# Patient Record
Sex: FEMALE | Race: BLACK OR AFRICAN AMERICAN | Employment: FULL TIME | URBAN - METROPOLITAN AREA
[De-identification: names, ages, dates, MRNs, and addresses within clinical notes are randomized per-mention and may not be internally consistent; named-entity substitution may affect disease eponyms.]

---

## 2017-02-18 ENCOUNTER — HOSPITAL ENCOUNTER (EMERGENCY)
Age: 32
Discharge: HOME | End: 2017-02-18
Attending: FAMILY MEDICINE

## 2017-02-18 VITALS
TEMPERATURE: 97.2 F | HEIGHT: 67 IN | HEART RATE: 60 BPM | OXYGEN SATURATION: 98 % | BODY MASS INDEX: 31.86 KG/M2 | SYSTOLIC BLOOD PRESSURE: 180 MMHG | DIASTOLIC BLOOD PRESSURE: 116 MMHG | RESPIRATION RATE: 16 BRPM | WEIGHT: 203 LBS

## 2017-02-18 DIAGNOSIS — K08.409: Primary | ICD-10-CM

## 2017-02-18 DIAGNOSIS — K08.89 PAIN OF TOOTH SOCKET: ICD-10-CM

## 2017-02-18 RX ORDER — IBUPROFEN 800 MG/1
800 TABLET ORAL
Qty: 20 TAB | Refills: 0 | Status: SHIPPED | OUTPATIENT
Start: 2017-02-18 | End: 2017-02-25

## 2017-02-18 RX ORDER — AMOXICILLIN 500 MG/1
1000 CAPSULE ORAL 2 TIMES DAILY
Qty: 40 CAP | Refills: 0 | Status: SHIPPED | OUTPATIENT
Start: 2017-02-18 | End: 2017-06-21

## 2017-02-18 NOTE — DISCHARGE INSTRUCTIONS
Dental Pain: After Your Visit  Your Care Instructions  The most common cause of dental pain is tooth decay. It can also be caused by an infection of the tooth (abscess) or gum, a tooth that has not broken all the way through the gum (impacted tooth), or a problem with the nerve-filled center of the tooth. Follow-up care is a key part of your treatment and safety. Be sure to make and go to all appointments, and call your doctor if you are having problems. Its also a good idea to know your test results and keep a list of the medicines you take. How can you care for yourself at home? · Contact a dentist for follow-up care. · Put ice or a cold pack on the outside of your mouth for 10 to 20 minutes at a time to reduce pain and swelling. Put a thin cloth between the ice and your skin. · Take an over-the-counter pain medicine, such as acetaminophen (Tylenol), ibuprofen (Advil, Motrin), or naproxen (Aleve). Read and follow all instructions on the label. · Do not take two or more pain medicines at the same time unless the doctor told you to. Many pain medicines have acetaminophen, which is Tylenol. Too much acetaminophen (Tylenol) can be harmful. · Rinse your mouth with warm salt water every 2 hours to help relieve pain and swelling from an infected tooth. Mix 1 teaspoon of salt in 8 ounces of water. · If your doctor prescribed antibiotics, take them as directed. Do not stop taking them just because you feel better. You need to take the full course of antibiotics. When should you call for help? Call your doctor now or seek immediate medical care if:  · You have signs of infection, such as:  ¨ Increased pain, swelling, warmth, or redness. ¨ Pus draining from the gum, tooth, or face. ¨ A fever. Watch closely for changes in your health, and be sure to contact your doctor if:  · You do not get better as expected. Where can you learn more?    Go to [email or web address].be  Enter P464 in the search box to learn more about \"Dental Pain: After Your Visit. \"   © 3125-9785 [employer]. Care instructions adapted under license by [employer] (which disclaims liability or warranty for this information). This care instruction is for use with your licensed healthcare professional. If you have questions about a medical condition or this instruction, always ask your healthcare professional. [address] any warranty or liability for your use of this information. Content Version: 99.6.533064;  Last Revised: May 17, 2013

## 2017-02-18 NOTE — UC PROVIDER NOTE
Patient is a 32 y.o. female presenting with dental problem. The history is provided by the patient. Dental Pain    This is a new problem. The current episode started more than 1 week ago. Episode frequency: intermittent  The pain is located in the left upper mouth. The pain is at a severity of 3/10. There was no swelling and no headaches. The patient has no cardiac history. Past Medical History   Diagnosis Date    Anemia NEC      with pregnancy. Taking IRON    Essential hypertension     Hypertension     Infertility      cerclage, prev sab    Ovarian cyst         Past Surgical History   Procedure Laterality Date    Hx lap cholecystectomy  2008    Hx gyn       Circlage         Family History   Problem Relation Age of Onset    Stroke Mother     Diabetes Father     Hypertension Father     Diabetes Sister         Social History     Social History    Marital status: SINGLE     Spouse name: N/A    Number of children: N/A    Years of education: N/A     Occupational History    Not on file. Social History Main Topics    Smoking status: Former Smoker    Smokeless tobacco: Never Used      Comment: one or two cigarettes a month    Alcohol use Yes      Comment: occasional    Drug use: No      Comment: daily    Sexual activity: Yes     Partners: Male     Birth control/ protection: None     Other Topics Concern    Not on file     Social History Narrative                ALLERGIES: Review of patient's allergies indicates no known allergies. Review of Systems   HENT: Positive for dental problem. Vitals:    02/18/17 1506 02/18/17 1510   BP:  (!) 180/116   Pulse:  60   Resp:  16   Temp:  97.2 °F (36.2 °C)   SpO2:  98%   Weight: 92.1 kg (203 lb)    Height: 5' 7\" (1.702 m)        Physical Exam   Constitutional: No distress. HENT:   Mouth/Throat: Oropharynx is clear and moist and mucous membranes are normal. No oral lesions. Abnormal dentition (absent tooth as shown ).  No dental abscesses or dental caries. Nursing note and vitals reviewed.       MDM     Differential Diagnosis; Clinical Impression; Plan:     CLINICAL IMPRESSION:  Acquired absence of single tooth  (primary encounter diagnosis)  Pain of tooth socket      DDX    Plan:    Follow [facility] dentist  Amoxicillin and motrin with tylenol   Amount and/or Complexity of Data Reviewed:    Review and summarize past medical records:  Yes  Risk of Significant Complications, Morbidity, and/or Mortality:   Presenting problems:  Low  Management options:  Low  Progress:   Patient progress:  Stable      Procedures

## 2017-06-21 ENCOUNTER — HOSPITAL ENCOUNTER (EMERGENCY)
Age: 32
Discharge: HOME | End: 2017-06-21
Attending: EMERGENCY MEDICINE
Payer: SELF-PAY

## 2017-06-21 ENCOUNTER — APPOINTMENT (OUTPATIENT)
Dept: ULTRASOUND IMAGING | Age: 32
End: 2017-06-21
Attending: EMERGENCY MEDICINE
Payer: SELF-PAY

## 2017-06-21 VITALS
RESPIRATION RATE: 18 BRPM | HEIGHT: 67 IN | SYSTOLIC BLOOD PRESSURE: 163 MMHG | DIASTOLIC BLOOD PRESSURE: 97 MMHG | WEIGHT: 188.71 LBS | TEMPERATURE: 98.3 F | BODY MASS INDEX: 29.62 KG/M2 | OXYGEN SATURATION: 100 % | HEART RATE: 63 BPM

## 2017-06-21 DIAGNOSIS — O20.9 VAGINAL BLEEDING IN PREGNANCY, FIRST TRIMESTER: Primary | ICD-10-CM

## 2017-06-21 LAB
ANION GAP BLD CALC-SCNC: 8 MMOL/L (ref 5–15)
BASOPHILS # BLD AUTO: 0 K/UL (ref 0–0.1)
BASOPHILS # BLD: 0 % (ref 0–1)
BUN SERPL-MCNC: 12 MG/DL (ref 6–20)
BUN/CREAT SERPL: 13 (ref 12–20)
CALCIUM SERPL-MCNC: 8.9 MG/DL (ref 8.5–10.1)
CHLORIDE SERPL-SCNC: 101 MMOL/L (ref 97–108)
CO2 SERPL-SCNC: 27 MMOL/L (ref 21–32)
CREAT SERPL-MCNC: 0.92 MG/DL (ref 0.55–1.02)
EOSINOPHIL # BLD: 0.1 K/UL (ref 0–0.4)
EOSINOPHIL NFR BLD: 1 % (ref 0–7)
ERYTHROCYTE [DISTWIDTH] IN BLOOD BY AUTOMATED COUNT: 17 % (ref 11.5–14.5)
GLUCOSE SERPL-MCNC: 80 MG/DL (ref 65–100)
HCG SERPL-ACNC: 5217 MIU/ML (ref 0–6)
HCT VFR BLD AUTO: 38.6 % (ref 35–47)
HGB BLD-MCNC: 12.7 G/DL (ref 11.5–16)
LYMPHOCYTES # BLD AUTO: 28 % (ref 12–49)
LYMPHOCYTES # BLD: 3 K/UL (ref 0.8–3.5)
MCH RBC QN AUTO: 26.3 PG (ref 26–34)
MCHC RBC AUTO-ENTMCNC: 32.9 G/DL (ref 30–36.5)
MCV RBC AUTO: 79.9 FL (ref 80–99)
MONOCYTES # BLD: 0.6 K/UL (ref 0–1)
MONOCYTES NFR BLD AUTO: 6 % (ref 5–13)
NEUTS SEG # BLD: 6.9 K/UL (ref 1.8–8)
NEUTS SEG NFR BLD AUTO: 65 % (ref 32–75)
PLATELET # BLD AUTO: 270 K/UL (ref 150–400)
POTASSIUM SERPL-SCNC: 3.2 MMOL/L (ref 3.5–5.1)
RBC # BLD AUTO: 4.83 M/UL (ref 3.8–5.2)
SODIUM SERPL-SCNC: 136 MMOL/L (ref 136–145)
WBC # BLD AUTO: 10.7 K/UL (ref 3.6–11)

## 2017-06-21 PROCEDURE — 99282 EMERGENCY DEPT VISIT SF MDM: CPT

## 2017-06-21 PROCEDURE — 80048 BASIC METABOLIC PNL TOTAL CA: CPT | Performed by: EMERGENCY MEDICINE

## 2017-06-21 PROCEDURE — 84702 CHORIONIC GONADOTROPIN TEST: CPT | Performed by: EMERGENCY MEDICINE

## 2017-06-21 PROCEDURE — 76817 TRANSVAGINAL US OBSTETRIC: CPT

## 2017-06-21 PROCEDURE — 36415 COLL VENOUS BLD VENIPUNCTURE: CPT | Performed by: EMERGENCY MEDICINE

## 2017-06-21 PROCEDURE — 85025 COMPLETE CBC W/AUTO DIFF WBC: CPT | Performed by: EMERGENCY MEDICINE

## 2017-06-21 NOTE — ED NOTES
Bedside shift change report given to Natasha Chance (oncoming nurse) by Christina Willams (offgoing nurse). Report included the following information SBAR and ED Summary.

## 2017-06-21 NOTE — DISCHARGE INSTRUCTIONS
Call your OB and have your hormone levels in 48-72 hours. Vaginal Bleeding During Pregnancy: Care Instructions  Your Care Instructions    Many women have some vaginal bleeding when they are pregnant. In some cases, the bleeding is not serious. And there aren't any more problems with the pregnancy. But sometimes bleeding is a sign of a more serious problem. This is more common if the bleeding is heavy or painful. Examples of more serious problems include miscarriage, an ectopic pregnancy, or a problem with the placenta. You may have to see your doctor again to be sure everything is okay. You may also need more tests to find the cause of the bleeding. For some women, home treatment is all they need. But it depends on what is causing the bleeding. Be sure to tell your doctor if you have any new symptoms or if your symptoms get worse. The doctor has checked you carefully, but problems can develop later. If you notice any problems or new symptoms, get medical treatment right away. Follow-up care is a key part of your treatment and safety. Be sure to make and go to all appointments, and call your doctor if you are having problems. It's also a good idea to know your test results and keep a list of the medicines you take. How can you care for yourself at home? · If your doctor prescribed medicines, take them exactly as directed. Call your doctor if you think you are having a problem with your medicine. · Do not have sex until the bleeding stops and your doctor says it's okay. · Ask your doctor about other activities you can or can't do. · Get a lot of rest. Being pregnant can make you tired. · Eat a healthy diet. Include a lot of peas, beans, and leafy green vegetables. Talk to a dietitian if you need help planning your diet. · Do not use nonsteroidal anti-inflammatory drugs (NSAIDs), such as ibuprofen (Advil, Motrin), naproxen (Aleve), or aspirin, unless your doctor says it is okay.   When should you call for help? Call 911 anytime you think you may need emergency care. For example, call if:  · You have sudden, severe pain in your belly. · You passed out (lost consciousness). · You have severe vaginal bleeding. Call your doctor now or seek immediate medical care if:  · You are dizzy or lightheaded or you feel like you may faint. · You have new or worse pain in your belly or pelvis. · Your vaginal bleeding is getting worse. · You have increased pain in the vaginal area. · You have a fever. Watch closely for changes in your health, and be sure to contact your doctor if:  · You have new or worse vaginal discharge. · You do not get better as expected. Where can you learn more? Go to http://molly-howie.info/. Enter E678 in the search box to learn more about \"Vaginal Bleeding During Pregnancy: Care Instructions. \"  Current as of: March 16, 2017  Content Version: 11.3  © 5031-5670 [employer]. Care instructions adapted under license by [employer] (which disclaims liability or warranty for this information). If you have questions about a medical condition or this instruction, always ask your healthcare professional. [address] any warranty or liability for your use of this information.

## 2017-06-21 NOTE — ED PROVIDER NOTES
HPI Comments: Tristan Cline is a 32 y.o.  female who presents ambulatory to ED [facility] ED after having spotting x ~3 days. Pt reports she had positive pregnancy test 5 days ago, and reports LMP (2017). She states she also had mild spotting in (2017), but states she could be as much as 9.5 weeks pregnant. Pt reports hx of cerclage at ~15 weeks, and states she was referred to ED by her OB/Gyn today to have procedure again. Pt denies allergies to any medications. She specifically denies ABD pain, dysuria, and back pain. PCP: None  OB/Gyn: Glenna Cr MD    There are no other complaints, changes, or physical findings at this time. The history is provided by the patient. Past Medical History:   Diagnosis Date    Anemia NEC     with pregnancy. Taking IRON    Essential hypertension     Hypertension     Infertility     cerclage, prev sab    Ovarian cyst        Past Surgical History:   Procedure Laterality Date    HX GYN      Circlage    HX LAP CHOLECYSTECTOMY           Family History:   Problem Relation Age of Onset    Stroke Mother     Diabetes Father     Hypertension Father     Diabetes Sister        Social History     Social History    Marital status: SINGLE     Spouse name: N/A    Number of children: N/A    Years of education: N/A     Occupational History    Not on file. Social History Main Topics    Smoking status: Former Smoker    Smokeless tobacco: Never Used      Comment: one or two cigarettes a month    Alcohol use Yes      Comment: occasional    Drug use: No      Comment: daily    Sexual activity: Yes     Partners: Male     Birth control/ protection: None     Other Topics Concern    Not on file     Social History Narrative         ALLERGIES: Review of patient's allergies indicates no known allergies. Review of Systems   Constitutional: Negative for activity change, appetite change, chills, fever and unexpected weight change.    HENT: Negative for congestion. Eyes: Negative for pain and visual disturbance. Respiratory: Negative for cough and shortness of breath. Cardiovascular: Negative for chest pain. Gastrointestinal: Negative for abdominal pain, diarrhea, nausea and vomiting. Genitourinary: Positive for vaginal bleeding (spotting). Negative for dysuria. Musculoskeletal: Negative for back pain. Skin: Negative for rash. Neurological: Negative for headaches. Patient Vitals for the past 12 hrs:   Temp Pulse Resp BP SpO2   06/21/17 1515 - - - (!) 163/97 100 %   06/21/17 1356 - - - - 100 %   06/21/17 1323 98.3 °F (36.8 °C) 63 18 (!) 175/114 100 %            Physical Exam   Constitutional: She is oriented to person, place, and time. She appears well-developed and well-nourished. Overweight female   HENT:   Head: Normocephalic and atraumatic. Mouth/Throat: Oropharynx is clear and moist.   Eyes: Conjunctivae and EOM are normal. Pupils are equal, round, and reactive to light. Right eye exhibits no discharge. Left eye exhibits no discharge. Neck: Normal range of motion. Neck supple. Cardiovascular: Normal rate and normal heart sounds. No murmur heard. Pulmonary/Chest: Effort normal and breath sounds normal. No respiratory distress. She has no wheezes. She has no rales. Abdominal: Soft. Bowel sounds are normal. She exhibits no distension. There is no tenderness. Musculoskeletal: Normal range of motion. Neurological: She is alert and oriented to person, place, and time. No cranial nerve deficit. She exhibits normal muscle tone. Skin: Skin is warm and dry. No rash noted. She is not diaphoretic. Nursing note and vitals reviewed. MDM  Number of Diagnoses or Management Options  Diagnosis management comments: Well appearing, in no acute distress, with hemodynamic stability. No evidence of ectopic on exam. Will check US for dates and viability for pregnancy.        Amount and/or Complexity of Data Reviewed  Clinical lab tests: ordered and reviewed  Tests in the radiology section of CPT®: ordered and reviewed  Review and summarize past medical records: yes  Independent visualization of images, tracings, or specimens: yes      ED Course       Procedures  16:00 Discussed results and need for 48-72 hour recheck. No further bleeding. LABORATORY TESTS:  Recent Results (from the past 12 hour(s))   CBC WITH AUTOMATED DIFF    Collection Time: 06/21/17  2:20 PM   Result Value Ref Range    WBC 10.7 3.6 - 11.0 K/uL    RBC 4.83 3.80 - 5.20 M/uL    HGB 12.7 11.5 - 16.0 g/dL    HCT 38.6 35.0 - 47.0 %    MCV 79.9 (L) 80.0 - 99.0 FL    MCH 26.3 26.0 - 34.0 PG    MCHC 32.9 30.0 - 36.5 g/dL    RDW 17.0 (H) 11.5 - 14.5 %    PLATELET 779 270 - 369 K/uL    NEUTROPHILS 65 32 - 75 %    LYMPHOCYTES 28 12 - 49 %    MONOCYTES 6 5 - 13 %    EOSINOPHILS 1 0 - 7 %    BASOPHILS 0 0 - 1 %    ABS. NEUTROPHILS 6.9 1.8 - 8.0 K/UL    ABS. LYMPHOCYTES 3.0 0.8 - 3.5 K/UL    ABS. MONOCYTES 0.6 0.0 - 1.0 K/UL    ABS. EOSINOPHILS 0.1 0.0 - 0.4 K/UL    ABS. BASOPHILS 0.0 0.0 - 0.1 K/UL   METABOLIC PANEL, BASIC    Collection Time: 06/21/17  2:20 PM   Result Value Ref Range    Sodium 136 136 - 145 mmol/L    Potassium 3.2 (L) 3.5 - 5.1 mmol/L    Chloride 101 97 - 108 mmol/L    CO2 27 21 - 32 mmol/L    Anion gap 8 5 - 15 mmol/L    Glucose 80 65 - 100 mg/dL    BUN 12 6 - 20 MG/DL    Creatinine 0.92 0.55 - 1.02 MG/DL    BUN/Creatinine ratio 13 12 - 20      GFR est AA >60 >60 ml/min/1.73m2    GFR est non-AA >60 >60 ml/min/1.73m2    Calcium 8.9 8.5 - 10.1 MG/DL   TOTAL HCG, QT. Collection Time: 06/21/17  2:20 PM   Result Value Ref Range    Beta HCG, QT 5217 (H) 0 - 6 MIU/ML       IMAGING RESULTS:  US UTS TRANSVAGINAL OB   Final Result   EXAM:  US UTS TRANSVAGINAL OB     INDICATION:  First trimester pregnancy with positive urine pregnancy test 5 days  ago. Vaginal bleeding started 3 days ago. Previous spontaneous abortions.  LMP on  4/15/2017.     COMPARISON: None.     TECHNIQUE: Endovaginal ultrasound of the female pelvis, limited first trimester  obstetric ultrasound.     FINDINGS: Transabdominal ultrasound:     Not performed.     Endovaginal ultrasound:      Urinary bladder: Nondistended.     Uterus: measures 7.9 x 6.9 x 5.4 cm, which is within normal limits. There is no  sonographic myometrial abnormality. Cervix contains nabothian cysts. Cervix is  closed.     Endometrium: Within the fundal portion of the endometrium, a gestational sac has  a mean sac diameter of 0.88 cm. Yolk sac is present. Fetal pole is not present. No fetal cardiac activity. Amniotic fluid volume is within normal limits. Placenta is not yet developed. Small heterogeneous hypoechoic subchorionic  hemorrhage.     Right ovary: 4.9 x 2.9 x 2.1 cm. Blood flow is present in the right ovary. No  right adnexal mass or cyst.     Left ovary: 3.6 x 2.2 x 2.2 cm. Blood flow is present in the left ovary. No  adnexal cyst or mass.     Free fluid: Physiologic.     IMPRESSION  IMPRESSION:   Small gestational sac contains a yolk sac only. No fetal pole. Too small to  date. Small subchorionic hemorrhage. Follow-up serial beta hCG levels. Repeat  ultrasound if clinically indicated. IMPRESSION:  1. Vaginal bleeding in pregnancy, first trimester        PLAN:  1. Follow-up Information     Follow up With Details Comments Contact Info    \[facility]\"" EMERGENCY DEPT  If symptoms worsen [address] [place] [address]  [phone]        Return to ED if worse     DISCHARGE NOTE:  4:05 PM  The patient is ready for discharge. The patients signs, symptoms, diagnosis, and instructions for discharge have been discussed and the pt has conveyed their understanding. The patient is to follow up as recommended or return to the ER should their symptoms worsen. Plan has been discussed and patient has conveyed their agreement.         This note is prepared by Kyle Sanchez, acting as Scribe for [email or web address], MD.    Marisela Burkitt, MD: The scribe's documentation has been prepared under my direction and personally reviewed by me in its entirety. I confirm that the note above accurately reflects all work, treatment, procedures, and medical decision making performed by me.

## 2017-06-21 NOTE — ED NOTES
Patient states that she took a pregnancy test 5 days ago and that she started \"spotting\" about 3 days ago. Patient states that she had to have a cerclage with her last pregnancy and was previously told that she needed to be seen as soon as possible with any future pregnancy. Denies any abdominal pain at this time.

## 2017-07-11 LAB
HBSAG, EXTERNAL: NEGATIVE
HIV, EXTERNAL: NON REACTIVE
RPR, EXTERNAL: NON REACTIVE
RUBELLA, EXTERNAL: NORMAL
TYPE, ABO & RH, EXTERNAL: NORMAL

## 2017-07-24 LAB
CHLAMYDIA, EXTERNAL: NEGATIVE
N. GONORRHEA, EXTERNAL: NEGATIVE

## 2017-08-25 ENCOUNTER — HOSPITAL ENCOUNTER (EMERGENCY)
Age: 32
Discharge: HOME | End: 2017-08-25
Attending: EMERGENCY MEDICINE
Payer: MEDICAID

## 2017-08-25 VITALS
RESPIRATION RATE: 12 BRPM | OXYGEN SATURATION: 100 % | HEART RATE: 95 BPM | SYSTOLIC BLOOD PRESSURE: 154 MMHG | TEMPERATURE: 98.3 F | DIASTOLIC BLOOD PRESSURE: 87 MMHG

## 2017-08-25 DIAGNOSIS — Z98.890 H/O CERVICAL CERCLAGE, CURRENTLY PREGNANT, SECOND TRIMESTER: ICD-10-CM

## 2017-08-25 DIAGNOSIS — O46.92 VAGINAL BLEEDING IN PREGNANCY, SECOND TRIMESTER: Primary | ICD-10-CM

## 2017-08-25 DIAGNOSIS — N30.00 ACUTE CYSTITIS WITHOUT HEMATURIA: ICD-10-CM

## 2017-08-25 DIAGNOSIS — O09.292 H/O CERVICAL CERCLAGE, CURRENTLY PREGNANT, SECOND TRIMESTER: ICD-10-CM

## 2017-08-25 LAB
ALBUMIN SERPL-MCNC: 2.9 G/DL (ref 3.5–5)
ALBUMIN/GLOB SERPL: 0.7 {RATIO} (ref 1.1–2.2)
ALP SERPL-CCNC: 64 U/L (ref 45–117)
ALT SERPL-CCNC: 12 U/L (ref 12–78)
ANION GAP SERPL CALC-SCNC: 10 MMOL/L (ref 5–15)
APPEARANCE UR: ABNORMAL
AST SERPL-CCNC: 9 U/L (ref 15–37)
BACTERIA URNS QL MICRO: ABNORMAL /HPF
BASOPHILS # BLD: 0 K/UL (ref 0–0.1)
BASOPHILS NFR BLD: 0 % (ref 0–1)
BILIRUB SERPL-MCNC: 0.2 MG/DL (ref 0.2–1)
BILIRUB UR QL CFM: NEGATIVE
BUN SERPL-MCNC: 8 MG/DL (ref 6–20)
BUN/CREAT SERPL: 10 (ref 12–20)
CALCIUM SERPL-MCNC: 8.5 MG/DL (ref 8.5–10.1)
CHLORIDE SERPL-SCNC: 103 MMOL/L (ref 97–108)
CO2 SERPL-SCNC: 23 MMOL/L (ref 21–32)
COLOR UR: ABNORMAL
CREAT SERPL-MCNC: 0.78 MG/DL (ref 0.55–1.02)
EOSINOPHIL # BLD: 0.1 K/UL (ref 0–0.4)
EOSINOPHIL NFR BLD: 1 % (ref 0–7)
EPITH CASTS URNS QL MICRO: ABNORMAL /LPF
ERYTHROCYTE [DISTWIDTH] IN BLOOD BY AUTOMATED COUNT: 14.7 % (ref 11.5–14.5)
GLOBULIN SER CALC-MCNC: 4 G/DL (ref 2–4)
GLUCOSE SERPL-MCNC: 125 MG/DL (ref 65–100)
GLUCOSE UR STRIP.AUTO-MCNC: NEGATIVE MG/DL
HCT VFR BLD AUTO: 34.7 % (ref 35–47)
HGB BLD-MCNC: 11.5 G/DL (ref 11.5–16)
HGB UR QL STRIP: ABNORMAL
KETONES UR QL STRIP.AUTO: ABNORMAL MG/DL
LEUKOCYTE ESTERASE UR QL STRIP.AUTO: ABNORMAL
LYMPHOCYTES # BLD: 1.9 K/UL (ref 0.8–3.5)
LYMPHOCYTES NFR BLD: 21 % (ref 12–49)
MCH RBC QN AUTO: 26.4 PG (ref 26–34)
MCHC RBC AUTO-ENTMCNC: 33.1 G/DL (ref 30–36.5)
MCV RBC AUTO: 79.6 FL (ref 80–99)
MONOCYTES # BLD: 0.4 K/UL (ref 0–1)
MONOCYTES NFR BLD: 5 % (ref 5–13)
NEUTS SEG # BLD: 6.5 K/UL (ref 1.8–8)
NEUTS SEG NFR BLD: 73 % (ref 32–75)
NITRITE UR QL STRIP.AUTO: NEGATIVE
PH UR STRIP: 6.5 [PH] (ref 5–8)
PLATELET # BLD AUTO: 249 K/UL (ref 150–400)
POTASSIUM SERPL-SCNC: 2.9 MMOL/L (ref 3.5–5.1)
PROT SERPL-MCNC: 6.9 G/DL (ref 6.4–8.2)
PROT UR STRIP-MCNC: 30 MG/DL
RBC # BLD AUTO: 4.36 M/UL (ref 3.8–5.2)
RBC #/AREA URNS HPF: ABNORMAL /HPF (ref 0–5)
SODIUM SERPL-SCNC: 136 MMOL/L (ref 136–145)
SP GR UR REFRACTOMETRY: 1.02 (ref 1–1.03)
UA: UC IF INDICATED,UAUC: ABNORMAL
UROBILINOGEN UR QL STRIP.AUTO: 1 EU/DL (ref 0.2–1)
WBC # BLD AUTO: 9 K/UL (ref 3.6–11)
WBC URNS QL MICRO: ABNORMAL /HPF (ref 0–4)

## 2017-08-25 PROCEDURE — 85025 COMPLETE CBC W/AUTO DIFF WBC: CPT | Performed by: EMERGENCY MEDICINE

## 2017-08-25 PROCEDURE — 74011250637 HC RX REV CODE- 250/637: Performed by: EMERGENCY MEDICINE

## 2017-08-25 PROCEDURE — 99284 EMERGENCY DEPT VISIT MOD MDM: CPT

## 2017-08-25 PROCEDURE — 80053 COMPREHEN METABOLIC PANEL: CPT | Performed by: EMERGENCY MEDICINE

## 2017-08-25 PROCEDURE — 87086 URINE CULTURE/COLONY COUNT: CPT | Performed by: EMERGENCY MEDICINE

## 2017-08-25 PROCEDURE — 81001 URINALYSIS AUTO W/SCOPE: CPT | Performed by: EMERGENCY MEDICINE

## 2017-08-25 PROCEDURE — 36415 COLL VENOUS BLD VENIPUNCTURE: CPT | Performed by: EMERGENCY MEDICINE

## 2017-08-25 PROCEDURE — 87491 CHLMYD TRACH DNA AMP PROBE: CPT | Performed by: EMERGENCY MEDICINE

## 2017-08-25 RX ORDER — CEPHALEXIN 500 MG/1
500 CAPSULE ORAL 3 TIMES DAILY
Qty: 15 CAP | Refills: 0 | Status: SHIPPED | OUTPATIENT
Start: 2017-08-25 | End: 2017-08-30

## 2017-08-25 RX ORDER — POTASSIUM CHLORIDE 20 MEQ/1
60 TABLET, EXTENDED RELEASE ORAL
Status: COMPLETED | OUTPATIENT
Start: 2017-08-25 | End: 2017-08-25

## 2017-08-25 RX ADMIN — POTASSIUM CHLORIDE 60 MEQ: 1500 TABLET, EXTENDED RELEASE ORAL at 10:44

## 2017-08-25 NOTE — ED NOTES
Pt arrives to ED via triage c/o vaginal bleeding (pink in color) which began this morning-- no clots or abnormal cramping per pt. Pt had a cerclage last week. This is the pts 5th pregnancy with 1 living child, 3 miscarriages. Monitor x 2. Pt placed in bed in position of comfort with side rails up x 2, call bell within reach. Pt instructed to call for nursing assistance with any ambulation or other needs.

## 2017-08-25 NOTE — DISCHARGE INSTRUCTIONS
Urinary Tract Infection in Pregnancy: Care Instructions  Your Care Instructions    A urinary tract infection, or UTI, is an infection of the bladder and other urinary structures. Most UTIs occur in the bladder. They often cause pain or burning when you urinate. UTI is the most common bacterial infection in pregnancy. If untreated, a UTI could lead to problems such as a kidney infection or  labor. Most UTIs can be cured with antibiotics. Your doctor will prescribe an antibiotic that is safe during pregnancy. Be sure to finish your medicine so that the infection does not spread to your kidneys. Follow-up care is a key part of your treatment and safety. Be sure to make and go to all appointments, and call your doctor if you are having problems. It's also a good idea to know your test results and keep a list of the medicines you take. How can you care for yourself at home? · Take your antibiotics as directed. Do not stop taking them just because you feel better. You need to take the full course of antibiotics. · Drink extra water and other fluids for the next day or two. This will help wash out the bacteria causing the infection. If you have kidney, heart, or liver disease and have to limit fluids, talk with your doctor before you increase the amount of fluids you drink. · Do not drink alcohol. · Urinate often. Try to empty your bladder each time. Preventing UTIs  · Drink plenty of fluids, enough so that your urine is light yellow or clear like water. This helps you urinate often, which clears bacteria from your system. If you have kidney, heart, or liver disease and have to limit fluids, talk with your doctor before you increase the amount of fluids you drink. · Urinate when you first have the urge. · Urinate right after you have sex. This is the best way for women to avoid UTIs. · When going to the bathroom, wipe from front to back to keep bacteria from entering the vagina or urethra.   When should you call for help? Call your doctor now or seek immediate medical care if:  · Symptoms such as fever, chills, nausea, or vomiting get worse or appear for the first time. · You have new pain in your back just below your rib cage. This is called flank pain. · There is new blood or pus in your urine. · You have any problems with your antibiotic medicine. Watch closely for changes in your health, and be sure to contact your doctor if:  · You are not getting better after 1 day (24 hours). · You have new symptoms, such as blood in your urine. Where can you learn more? Go to http://molly-howie.info/. Enter M982 in the search box to learn more about \"Urinary Tract Infection in Pregnancy: Care Instructions. \"  Current as of: March 16, 2017  Content Version: 11.3  © 4529-9651 [employer]. Care instructions adapted under license by [employer] (which disclaims liability or warranty for this information). If you have questions about a medical condition or this instruction, always ask your healthcare professional. [address] any warranty or liability for your use of this information.

## 2017-08-25 NOTE — ED PROVIDER NOTES
HPI Comments: Olinda Chandra is a 32 y.o. Female Mike Neves currently approximately 13 wga with PMhx significant for anemia and HTN who presents ambulatory to the ED for further evaluation of vaginal bleeding x this morning. The pt is 8 days s/p cerclage by Dr. Dahiana Jimenez. She reports associated sx of a tan colored vaginal discharge x1 week as well. She expresses that she called her OB-GYN yesterday to make an appointment for the vaginal discharge but upon waking up this morning she noticed some vaginal bleeding leading her to the ED. She also notes that she has been having heavy vaginal discharge after using the restroom x1 week. The pt ensures that she has not visualized any clots or gushes of blood only spotting. She denies any fevers, chills, chest pain, SOB, cough, abdominal pain, nausea, vomiting, diarrhea, dysuria, hematuria or pelvic pain. PCP: None  OB-GYN: Nadia Medina MD      There are no other complaints, changes or physical findings at this time. Written by Theresa Gallegos ED Scribe, as dictated by Kulwinder Dejesus MD     The history is provided by the patient. No  was used. Past Medical History:   Diagnosis Date    Anemia NEC     with pregnancy. Taking IRON    Essential hypertension     Hypertension     Infertility     cerclage, prev sab    Ovarian cyst        Past Surgical History:   Procedure Laterality Date    HX GYN      Circlage    HX LAP CHOLECYSTECTOMY  2008         Family History:   Problem Relation Age of Onset    Stroke Mother     Diabetes Father     Hypertension Father     Diabetes Sister        Social History     Social History    Marital status: SINGLE     Spouse name: N/A    Number of children: N/A    Years of education: N/A     Occupational History    Not on file.      Social History Main Topics    Smoking status: Former Smoker    Smokeless tobacco: Never Used      Comment: one or two cigarettes a month    Alcohol use Yes Comment: occasional    Drug use: No      Comment: daily    Sexual activity: Yes     Partners: Male     Birth control/ protection: None     Other Topics Concern    Not on file     Social History Narrative         ALLERGIES: Review of patient's allergies indicates no known allergies. Review of Systems   Constitutional: Negative for chills, fatigue and fever. HENT: Negative for congestion, rhinorrhea and sore throat. Eyes: Negative for pain, discharge and visual disturbance. Respiratory: Negative for cough, chest tightness, shortness of breath and wheezing. Cardiovascular: Negative for chest pain, palpitations and leg swelling. Gastrointestinal: Negative for abdominal pain, constipation, diarrhea, nausea and vomiting. Genitourinary: Positive for vaginal bleeding and vaginal discharge. Negative for dysuria, frequency, hematuria and pelvic pain. Musculoskeletal: Negative for arthralgias, back pain and myalgias. Skin: Negative for rash. Neurological: Negative for dizziness, weakness, light-headedness and headaches. Psychiatric/Behavioral: Negative. Patient Vitals for the past 12 hrs:   Temp Pulse Resp BP SpO2   08/25/17 0826 - - - - 100 %   08/25/17 0825 98.3 °F (36.8 °C) 95 12 154/87 -           Physical Exam   Constitutional: She is oriented to person, place, and time. She appears well-developed and well-nourished. No distress. HENT:   Head: Normocephalic and atraumatic. Eyes: EOM are normal. Right eye exhibits no discharge. Left eye exhibits no discharge. No scleral icterus. Neck: Normal range of motion. Neck supple. No tracheal deviation present. Cardiovascular: Normal rate, regular rhythm, normal heart sounds and intact distal pulses. Exam reveals no gallop and no friction rub. No murmur heard. Pulmonary/Chest: Effort normal and breath sounds normal. No respiratory distress. She has no wheezes. She has no rales. Abdominal: Soft. She exhibits no distension.  There is no tenderness. Musculoskeletal: Normal range of motion. She exhibits no edema. Lymphadenopathy:     She has no cervical adenopathy. Neurological: She is alert and oriented to person, place, and time. No focal neuro deficits   Skin: Skin is warm and dry. No rash noted. Psychiatric: She has a normal mood and affect. Nursing note and vitals reviewed. MDM  Number of Diagnoses or Management Options  Acute cystitis without hematuria:   H/O cervical cerclage, currently pregnant, second trimester:   Vaginal bleeding in pregnancy, second trimester:   Diagnosis management comments:     Patient presents to ED with vaginal spotting. She is s/p cerclage. Per OB, will defer pelvic exam until patient can be evaluated on Monday. US indicates good fetal movement/HR. Blood type O+. UA concerning for UTI - will treat with keflex. Labs unremarkable with exception of hypokalemia which was repleted. Advised pelvic rest and follow up with OB on Monday as scheduled. Discussed results and follow up plan with patient. Provided customary return to ED instructions. Patient expressed understanding. Minerva Waller MD         Amount and/or Complexity of Data Reviewed  Clinical lab tests: ordered and reviewed  Review and summarize past medical records: yes  Discuss the patient with other providers: yes (OB-GYN)    Patient Progress  Patient progress: stable    ED Course       Procedures    Procedure Note - Bedside Ultrasound:  8:45 AM  Performed by: Obdulio Sandoval MD  US of uterus performed at beside, showing fetal movement with a hear rate of 140 bpm.   The procedure took 1-15 minutes, and pt tolerated well. Written by Gume Aleman ED Scribe, as dictated by Obdulio Sandoval MD.     CONSULT NOTE:  8:48 AM  Obdulio Sandoval MD spoke with Dr. Bharathi Stacy,  Specialty: OB-GYN  Discussed pt's hx, disposition, and available diagnostic and imaging results. Reviewed care plans.  Consultant recommends not performing pelvic exam and have the pt follow up in office on Monday. Written by GABBY Chand, as dictated by Renate Ochoa MD       LABORATORY TESTS:  Recent Results (from the past 12 hour(s))   CBC WITH AUTOMATED DIFF    Collection Time: 08/25/17  8:54 AM   Result Value Ref Range    WBC 9.0 3.6 - 11.0 K/uL    RBC 4.36 3.80 - 5.20 M/uL    HGB 11.5 11.5 - 16.0 g/dL    HCT 34.7 (L) 35.0 - 47.0 %    MCV 79.6 (L) 80.0 - 99.0 FL    MCH 26.4 26.0 - 34.0 PG    MCHC 33.1 30.0 - 36.5 g/dL    RDW 14.7 (H) 11.5 - 14.5 %    PLATELET 014 528 - 010 K/uL    NEUTROPHILS 73 32 - 75 %    LYMPHOCYTES 21 12 - 49 %    MONOCYTES 5 5 - 13 %    EOSINOPHILS 1 0 - 7 %    BASOPHILS 0 0 - 1 %    ABS. NEUTROPHILS 6.5 1.8 - 8.0 K/UL    ABS. LYMPHOCYTES 1.9 0.8 - 3.5 K/UL    ABS. MONOCYTES 0.4 0.0 - 1.0 K/UL    ABS. EOSINOPHILS 0.1 0.0 - 0.4 K/UL    ABS. BASOPHILS 0.0 0.0 - 0.1 K/UL   METABOLIC PANEL, COMPREHENSIVE    Collection Time: 08/25/17  8:54 AM   Result Value Ref Range    Sodium 136 136 - 145 mmol/L    Potassium 2.9 (L) 3.5 - 5.1 mmol/L    Chloride 103 97 - 108 mmol/L    CO2 23 21 - 32 mmol/L    Anion gap 10 5 - 15 mmol/L    Glucose 125 (H) 65 - 100 mg/dL    BUN 8 6 - 20 MG/DL    Creatinine 0.78 0.55 - 1.02 MG/DL    BUN/Creatinine ratio 10 (L) 12 - 20      GFR est AA >60 >60 ml/min/1.73m2    GFR est non-AA >60 >60 ml/min/1.73m2    Calcium 8.5 8.5 - 10.1 MG/DL    Bilirubin, total 0.2 0.2 - 1.0 MG/DL    ALT (SGPT) 12 12 - 78 U/L    AST (SGOT) 9 (L) 15 - 37 U/L    Alk.  phosphatase 64 45 - 117 U/L    Protein, total 6.9 6.4 - 8.2 g/dL    Albumin 2.9 (L) 3.5 - 5.0 g/dL    Globulin 4.0 2.0 - 4.0 g/dL    A-G Ratio 0.7 (L) 1.1 - 2.2     URINALYSIS W/ REFLEX CULTURE    Collection Time: 08/25/17  9:13 AM   Result Value Ref Range    Color DARK YELLOW      Appearance CLOUDY (A) CLEAR      Specific gravity 1.024 1.003 - 1.030      pH (UA) 6.5 5.0 - 8.0      Protein 30 (A) NEG mg/dL    Glucose NEGATIVE  NEG mg/dL    Ketone TRACE (A) NEG mg/dL    Blood LARGE (A) NEG      Urobilinogen 1.0 0.2 - 1.0 EU/dL    Nitrites NEGATIVE  NEG      Leukocyte Esterase MODERATE (A) NEG      WBC 10-20 0 - 4 /hpf    RBC 10-20 0 - 5 /hpf    Epithelial cells FEW FEW /lpf    Bacteria 1+ (A) NEG /hpf    UA:UC IF INDICATED URINE CULTURE ORDERED (A) CNI     BILIRUBIN, CONFIRM    Collection Time: 08/25/17  9:13 AM   Result Value Ref Range    Bilirubin UA, confirm NEGATIVE  NEG         MEDICATIONS GIVEN:  Medications   potassium chloride (K-DUR, KLOR-CON) SR tablet 60 mEq (not administered)       IMPRESSION:  1. Vaginal bleeding in pregnancy, second trimester    2. H/O cervical cerclage, currently pregnant, second trimester    3. Acute cystitis without hematuria        PLAN:  1. Current Discharge Medication List      START taking these medications    Details   cephALEXin (KEFLEX) 500 mg capsule Take 1 Cap by mouth three (3) times daily for 5 days. Qty: 15 Cap, Refills: 0           2. Follow-up Information     Follow up With Details Comments Ne Saenz MD  Please follow up on Monday as scheduled [facility]  [phone]      [facility] EMERGENCY DEPT  As needed, If symptoms worsen [address]  [address]  [phone]        3. Return to ED if worse  Discharge Note:  10:37 AM  The patient is ready for discharge. The patient's signs, symptoms, diagnosis, and discharge instruction have been discussed and the patient has conveyed their understanding. The patient is to follow up as recommended or return to the ER should their symptoms worsen. Plan has been discussed and the patient is in agreement. Written by Matheus Gallegos ED Scribe, as dictated by Medina Jessica MD      Attestation: This note is prepared by Mishel Gallegos, acting as Scribe for Medina Jessica MD.      Medina Jessica MD: The scribe's documentation has been prepared under my direction and personally reviewed by me in its entirety. I confirm that the note above accurately reflects all work, treatment, procedures, and medical decision making performed by me.

## 2017-08-26 LAB
BACTERIA SPEC CULT: NORMAL
CC UR VC: NORMAL
SERVICE CMNT-IMP: NORMAL

## 2017-08-28 LAB
C TRACH DNA SPEC QL NAA+PROBE: NEGATIVE
N GONORRHOEA DNA SPEC QL NAA+PROBE: NEGATIVE
SAMPLE TYPE: NORMAL
SERVICE CMNT-IMP: NORMAL
SPECIMEN SOURCE: NORMAL

## 2017-09-11 ENCOUNTER — HOSPITAL ENCOUNTER (INPATIENT)
Age: 32
LOS: 1 days | Discharge: HOME | DRG: 546 | End: 2017-09-12
Attending: EMERGENCY MEDICINE | Admitting: OBSTETRICS & GYNECOLOGY
Payer: MEDICAID

## 2017-09-11 DIAGNOSIS — Z33.2 ABORTION IN SECOND TRIMESTER: Primary | ICD-10-CM

## 2017-09-11 PROBLEM — O36.4XX0 INTRAUTERINE FETAL DEATH IN PREGNANCY: Status: ACTIVE | Noted: 2017-09-11

## 2017-09-11 LAB
ANION GAP SERPL CALC-SCNC: 8 MMOL/L (ref 5–15)
BASOPHILS # BLD: 0 K/UL (ref 0–0.1)
BASOPHILS NFR BLD: 0 % (ref 0–1)
BUN SERPL-MCNC: 8 MG/DL (ref 6–20)
BUN/CREAT SERPL: 12 (ref 12–20)
CALCIUM SERPL-MCNC: 8.8 MG/DL (ref 8.5–10.1)
CHLORIDE SERPL-SCNC: 104 MMOL/L (ref 97–108)
CO2 SERPL-SCNC: 25 MMOL/L (ref 21–32)
CREAT SERPL-MCNC: 0.69 MG/DL (ref 0.55–1.02)
EOSINOPHIL # BLD: 0.1 K/UL (ref 0–0.4)
EOSINOPHIL NFR BLD: 1 % (ref 0–7)
ERYTHROCYTE [DISTWIDTH] IN BLOOD BY AUTOMATED COUNT: 14.1 % (ref 11.5–14.5)
FIBRINOGEN PPP-MCNC: 431 MG/DL (ref 200–475)
GLUCOSE SERPL-MCNC: 88 MG/DL (ref 65–100)
HCT VFR BLD AUTO: 35.5 % (ref 35–47)
HGB BLD-MCNC: 11.8 G/DL (ref 11.5–16)
INR PPP: 1 (ref 0.9–1.1)
LYMPHOCYTES # BLD: 1.8 K/UL (ref 0.8–3.5)
LYMPHOCYTES NFR BLD: 14 % (ref 12–49)
MCH RBC QN AUTO: 26.3 PG (ref 26–34)
MCHC RBC AUTO-ENTMCNC: 33.2 G/DL (ref 30–36.5)
MCV RBC AUTO: 79.1 FL (ref 80–99)
MONOCYTES # BLD: 0.8 K/UL (ref 0–1)
MONOCYTES NFR BLD: 6 % (ref 5–13)
NEUTS SEG # BLD: 10 K/UL (ref 1.8–8)
NEUTS SEG NFR BLD: 79 % (ref 32–75)
PLATELET # BLD AUTO: 296 K/UL (ref 150–400)
POTASSIUM SERPL-SCNC: 3.3 MMOL/L (ref 3.5–5.1)
PROTHROMBIN TIME: 9.8 SEC (ref 9–11.1)
RBC # BLD AUTO: 4.49 M/UL (ref 3.8–5.2)
SODIUM SERPL-SCNC: 137 MMOL/L (ref 136–145)
WBC # BLD AUTO: 12.6 K/UL (ref 3.6–11)

## 2017-09-11 PROCEDURE — 75410000002 HC LABOR FEE PER 1 HR

## 2017-09-11 PROCEDURE — 88305 TISSUE EXAM BY PATHOLOGIST: CPT | Performed by: SPECIALIST

## 2017-09-11 PROCEDURE — 85610 PROTHROMBIN TIME: CPT

## 2017-09-11 PROCEDURE — 74011250637 HC RX REV CODE- 250/637: Performed by: SPECIALIST

## 2017-09-11 PROCEDURE — 65410000002 HC RM PRIVATE OB

## 2017-09-11 PROCEDURE — 85384 FIBRINOGEN ACTIVITY: CPT

## 2017-09-11 PROCEDURE — 74011250636 HC RX REV CODE- 250/636

## 2017-09-11 PROCEDURE — 75410000000 HC DELIVERY VAGINAL/SINGLE

## 2017-09-11 PROCEDURE — 86900 BLOOD TYPING SEROLOGIC ABO: CPT | Performed by: EMERGENCY MEDICINE

## 2017-09-11 PROCEDURE — 75410000003 HC RECOV DEL/VAG/CSECN EA 0.5 HR

## 2017-09-11 PROCEDURE — 80048 BASIC METABOLIC PNL TOTAL CA: CPT | Performed by: EMERGENCY MEDICINE

## 2017-09-11 PROCEDURE — 74011250637 HC RX REV CODE- 250/637: Performed by: OBSTETRICS & GYNECOLOGY

## 2017-09-11 PROCEDURE — 85025 COMPLETE CBC W/AUTO DIFF WBC: CPT | Performed by: EMERGENCY MEDICINE

## 2017-09-11 PROCEDURE — 99283 EMERGENCY DEPT VISIT LOW MDM: CPT

## 2017-09-11 PROCEDURE — 74011250636 HC RX REV CODE- 250/636: Performed by: SPECIALIST

## 2017-09-11 PROCEDURE — 86923 COMPATIBILITY TEST ELECTRIC: CPT | Performed by: EMERGENCY MEDICINE

## 2017-09-11 PROCEDURE — 36415 COLL VENOUS BLD VENIPUNCTURE: CPT

## 2017-09-11 RX ORDER — SODIUM CHLORIDE 0.9 % (FLUSH) 0.9 %
5-10 SYRINGE (ML) INJECTION AS NEEDED
Status: DISCONTINUED | OUTPATIENT
Start: 2017-09-11 | End: 2017-09-12 | Stop reason: HOSPADM

## 2017-09-11 RX ORDER — OXYCODONE AND ACETAMINOPHEN 5; 325 MG/1; MG/1
1 TABLET ORAL
Status: DISCONTINUED | OUTPATIENT
Start: 2017-09-11 | End: 2017-09-12 | Stop reason: HOSPADM

## 2017-09-11 RX ORDER — SODIUM CHLORIDE, SODIUM LACTATE, POTASSIUM CHLORIDE, CALCIUM CHLORIDE 600; 310; 30; 20 MG/100ML; MG/100ML; MG/100ML; MG/100ML
125 INJECTION, SOLUTION INTRAVENOUS CONTINUOUS
Status: DISCONTINUED | OUTPATIENT
Start: 2017-09-11 | End: 2017-09-12 | Stop reason: HOSPADM

## 2017-09-11 RX ORDER — OXYTOCIN/RINGER'S LACTATE 20/1000 ML
125-500 PLASTIC BAG, INJECTION (ML) INTRAVENOUS ONCE
Status: COMPLETED | OUTPATIENT
Start: 2017-09-11 | End: 2017-09-11

## 2017-09-11 RX ORDER — SODIUM CHLORIDE 0.9 % (FLUSH) 0.9 %
5-10 SYRINGE (ML) INJECTION EVERY 8 HOURS
Status: DISCONTINUED | OUTPATIENT
Start: 2017-09-11 | End: 2017-09-12 | Stop reason: HOSPADM

## 2017-09-11 RX ORDER — OXYTOCIN/RINGER'S LACTATE 20/1000 ML
PLASTIC BAG, INJECTION (ML) INTRAVENOUS
Status: COMPLETED
Start: 2017-09-11 | End: 2017-09-11

## 2017-09-11 RX ORDER — MORPHINE SULFATE 4 MG/ML
6 INJECTION, SOLUTION INTRAMUSCULAR; INTRAVENOUS
Status: COMPLETED | OUTPATIENT
Start: 2017-09-11 | End: 2017-09-11

## 2017-09-11 RX ORDER — IBUPROFEN 400 MG/1
800 TABLET ORAL EVERY 8 HOURS
Status: DISCONTINUED | OUTPATIENT
Start: 2017-09-11 | End: 2017-09-12 | Stop reason: HOSPADM

## 2017-09-11 RX ORDER — LABETALOL 200 MG/1
200 TABLET, FILM COATED ORAL 2 TIMES DAILY
Status: DISCONTINUED | OUTPATIENT
Start: 2017-09-11 | End: 2017-09-12 | Stop reason: HOSPADM

## 2017-09-11 RX ORDER — NALOXONE HYDROCHLORIDE 0.4 MG/ML
0.4 INJECTION, SOLUTION INTRAMUSCULAR; INTRAVENOUS; SUBCUTANEOUS AS NEEDED
Status: DISCONTINUED | OUTPATIENT
Start: 2017-09-11 | End: 2017-09-12 | Stop reason: HOSPADM

## 2017-09-11 RX ORDER — NIFEDIPINE 30 MG/1
30 TABLET, EXTENDED RELEASE ORAL DAILY
COMMUNITY
End: 2018-06-27

## 2017-09-11 RX ORDER — LABETALOL 200 MG/1
200 TABLET, FILM COATED ORAL 2 TIMES DAILY
COMMUNITY
End: 2018-08-24

## 2017-09-11 RX ORDER — HYDROCORTISONE ACETATE PRAMOXINE HCL 2.5; 1 G/100G; G/100G
CREAM TOPICAL AS NEEDED
Status: DISCONTINUED | OUTPATIENT
Start: 2017-09-11 | End: 2017-09-12 | Stop reason: HOSPADM

## 2017-09-11 RX ORDER — SODIUM CHLORIDE 9 MG/ML
250 INJECTION, SOLUTION INTRAVENOUS AS NEEDED
Status: DISCONTINUED | OUTPATIENT
Start: 2017-09-11 | End: 2017-09-12 | Stop reason: HOSPADM

## 2017-09-11 RX ORDER — FLUCONAZOLE 150 MG/1
150 TABLET ORAL DAILY
COMMUNITY
End: 2018-06-27

## 2017-09-11 RX ORDER — ZOLPIDEM TARTRATE 5 MG/1
5 TABLET ORAL
Status: DISCONTINUED | OUTPATIENT
Start: 2017-09-11 | End: 2017-09-12 | Stop reason: HOSPADM

## 2017-09-11 RX ADMIN — OXYCODONE HYDROCHLORIDE AND ACETAMINOPHEN 1 TABLET: 5; 325 TABLET ORAL at 20:19

## 2017-09-11 RX ADMIN — IBUPROFEN 800 MG: 400 TABLET, FILM COATED ORAL at 08:14

## 2017-09-11 RX ADMIN — LABETALOL HYDROCHLORIDE 200 MG: 200 TABLET, FILM COATED ORAL at 17:17

## 2017-09-11 RX ADMIN — OXYCODONE HYDROCHLORIDE AND ACETAMINOPHEN 1 TABLET: 5; 325 TABLET ORAL at 08:15

## 2017-09-11 RX ADMIN — ZOLPIDEM TARTRATE 5 MG: 5 TABLET ORAL at 22:38

## 2017-09-11 RX ADMIN — LABETALOL HYDROCHLORIDE 200 MG: 200 TABLET, FILM COATED ORAL at 09:29

## 2017-09-11 RX ADMIN — IBUPROFEN 800 MG: 400 TABLET, FILM COATED ORAL at 17:13

## 2017-09-11 RX ADMIN — Medication 20000 MILLI-UNITS: at 04:50

## 2017-09-11 RX ADMIN — Medication 10 ML: at 20:20

## 2017-09-11 RX ADMIN — MORPHINE SULFATE 6 MG: 4 INJECTION, SOLUTION INTRAMUSCULAR; INTRAVENOUS at 05:02

## 2017-09-11 NOTE — IP AVS SNAPSHOT
[address] [facility] 
[phone] Patient: Hakeem Flores MRN: [id number] Current Discharge Medication List  
  
START taking these medications Dose & Instructions Dispensing Information Comments Morning Noon Evening Bedtime  
 ibuprofen 800 mg tablet Commonly known as:  MOTRIN Your last dose was: Your next dose is:    
   
   
 Dose:  800 mg Take 1 Tab by mouth every eight (8) hours as needed for Pain. Quantity:  30 Tab Refills:  0  
     
   
   
   
  
 oxyCODONE-acetaminophen 5-325 mg per tablet Commonly known as:  PERCOCET Your last dose was: Your next dose is:    
   
   
 Dose:  1 Tab Take 1 Tab by mouth every four (4) hours as needed. Max Daily Amount: 6 Tabs. Quantity:  20 Tab Refills:  0 ASK your doctor about these medications Dose & Instructions Dispensing Information Comments Morning Noon Evening Bedtime CITRANATAL 90 DHA (ALGAL OIL) 90 mg iron-1 mg -50 mg-300 mg Cmpk Generic drug:  PNV72-iron carb,glu-FA-dss-dha Your last dose was: Your next dose is:    
   
   
 Dose:  1 Tab Take 1 Tab by mouth daily. Indications: Pregnancy, PREVENTION OF FETAL NEURAL TUBE DEFECTS Refills:  0  
     
   
   
   
  
 fluconazole 150 mg tablet Commonly known as:  DIFLUCAN Your last dose was: Your next dose is:    
   
   
 Dose:  150 mg Take 150 mg by mouth daily. FDA advises cautious prescribing of oral fluconazole in pregnancy. Indications: VULVOVAGINAL CANDIDIASIS Refills:  0  
     
   
   
   
  
 labetalol 200 mg tablet Commonly known as:  Randi Schultz Your last dose was: Your next dose is:    
   
   
 Dose:  200 mg Take 200 mg by mouth two (2) times a day. Indications: hypertension Refills:  0 PROCARDIA XL 30 mg ER tablet Generic drug:  NIFEdipine ER Your last dose was: Your next dose is:    
   
   
 Dose:  30 mg Take 30 mg by mouth daily. Indications: hypertension Refills:  0 Where to Get Your Medications Information on where to get these meds will be given to you by the nurse or doctor. ! Ask your nurse or doctor about these medications  
  ibuprofen 800 mg tablet  
 oxyCODONE-acetaminophen 5-325 mg per tablet

## 2017-09-11 NOTE — PROGRESS NOTES
RTS note- Patient with 17week loss. Holding and bonding with infant. Dr. Deirdre Moreno into see patient, mother asked him to identify gender, appears to be a girl. Patient plans to keep baby in room with her. ID bands placed on mother and baby. Mother chose hospital burial and process discussed, she had loss last year and utilized hospital burial.  RTS folder and info reviewed, support group info given, info given on childrens grief for 7yo daughter. Memory box given. Plan for patient to stay in hospital overnight and will follow up per Dr. Deirdre Moreno for BP and cerclage removal in the office.

## 2017-09-11 NOTE — IP AVS SNAPSHOT
[address] [facility] 
[phone] Patient: Nereida Palumbo MRN: [id number] You are allergic to the following No active allergies Recent Documentation Height Weight BMI OB Status Smoking Status 1.727 m 79.4 kg 26.61 kg/m2 Recent pregnancy Former Smoker Unresulted Labs Order Current Status SAMPLE TO BLOOD BANK In process TYPE + CROSSMATCH Preliminary result Emergency Contacts Name Discharge Info Relation Home Work Mobile Eva Woods TO CHOOSE [5] Sister [23] [phone] About your hospitalization You were admitted on:  September 11, 2017 You last received care in the:  [facility] LABOR & DELIVERY You were discharged on:  September 12, 2017 Unit phone number:  [phone] Why you were hospitalized Your primary diagnosis was:  Not on File Your diagnoses also included:  Intrauterine Fetal Death In Pregnancy Providers Seen During Your Hospitalizations Provider Role Specialty Primary office phone Lady Mitchel MD Attending Provider Emergency Medicine [phone] Adali Donaldson MD Attending Provider Obstetrics & Gynecology [phone] Your Primary Care Physician (PCP) Primary Care Physician Office Phone Office Fax UNKNOWN, PROVIDER ** None ** ** None ** Follow-up Information Follow up With Details Comments Contact Info Provider Unknown   Patient not available to ask Current Discharge Medication List  
  
START taking these medications Dose & Instructions Dispensing Information Comments Morning Noon Evening Bedtime  
 ibuprofen 800 mg tablet Commonly known as:  MOTRIN Your last dose was: Your next dose is:    
   
   
 Dose:  800 mg Take 1 Tab by mouth every eight (8) hours as needed for Pain. Quantity:  30 Tab Refills:  0 oxyCODONE-acetaminophen 5-325 mg per tablet Commonly known as:  PERCOCET Your last dose was: Your next dose is:    
   
   
 Dose:  1 Tab Take 1 Tab by mouth every four (4) hours as needed. Max Daily Amount: 6 Tabs. Quantity:  20 Tab Refills:  0 ASK your doctor about these medications Dose & Instructions Dispensing Information Comments Morning Noon Evening Bedtime CITRANATAL 90 DHA (ALGAL OIL) 90 mg iron-1 mg -50 mg-300 mg Cmpk Generic drug:  PNV72-iron carb,glu-FA-dss-dha Your last dose was: Your next dose is:    
   
   
 Dose:  1 Tab Take 1 Tab by mouth daily. Indications: Pregnancy, PREVENTION OF FETAL NEURAL TUBE DEFECTS Refills:  0  
     
   
   
   
  
 fluconazole 150 mg tablet Commonly known as:  DIFLUCAN Your last dose was: Your next dose is:    
   
   
 Dose:  150 mg Take 150 mg by mouth daily. FDA advises cautious prescribing of oral fluconazole in pregnancy. Indications: VULVOVAGINAL CANDIDIASIS Refills:  0  
     
   
   
   
  
 labetalol 200 mg tablet Commonly known as:  Jenni Listen Your last dose was: Your next dose is:    
   
   
 Dose:  200 mg Take 200 mg by mouth two (2) times a day. Indications: hypertension Refills:  0 PROCARDIA XL 30 mg ER tablet Generic drug:  NIFEdipine ER Your last dose was: Your next dose is:    
   
   
 Dose:  30 mg Take 30 mg by mouth daily. Indications: hypertension Refills:  0 Where to Get Your Medications Information on where to get these meds will be given to you by the nurse or doctor. ! Ask your nurse or doctor about these medications  
  ibuprofen 800 mg tablet  
 oxyCODONE-acetaminophen 5-325 mg per tablet Discharge Instructions                   POST PARTUM/ LOSS DISCHARGE INSTRUCTION 
 
 Post partum process includes: Afterbirth Pains:  
Abdominal pains often feel like contractions or strong menstrual cramps. Pain should decrease after three days but can last up to two weeks. Deep breathing and/or use of a heating pad may relieve these symptoms. You may also use over the counter or prescribed pain medication as directed by your CNM/physician. Vaginal Discharge:  
Vaginal discharge may be heavy red with clots. The flow should gradually decrease and change to pink, then brown, followed by a yellowish discharge. This may continue for two to six weeks. Use only pads, no tampons, until seen by your physician post discharge. Breast care:  
1)  Breasts may fill with milk around the third day after delivery. This can be a painful  
reminder of your loss. This can also be physically uncomfortable. 2)  Wear a well supporting bra, breast binder, or ace bandage around the breasts 24                      hours a day until breasts return to normal. 
3)  Application of ice packs two times a day under each arm may relieve engorgement. 4)  Do not express, pump, or use heat to relieve engorgement as this will only increase                      milk production. 5)  Use care in the shower as this may stimulate milk let down. Emotional Care: 
Be aware of the physical and psychological symptoms following loss. Information for you is located in the grief packet. Stages of Grief: 
1)  Denial 
2)  Anger 3)  Bargaining 4)  Depression 5)  Acceptance Call your doctor for the following: 
Fever over 101 degrees by mouth Vaginal bleeding heavier than a normal menstrual period or clots larger than golf ball size Red streaks in or increased swelling of legs Red streaks or lumps in breasts, or severe breast pain 
Painful urination, constipation Increased pain, swelling, drainage, or foul odor from your episiotomy, laceration, or incision If you feel extremely anxious or overwhelmed If you have thoughts of harming yourself or others Discharge Orders None Introducing [facility]! Dear Meet Primer: Thank you for requesting a Canva account. Our records indicate that you already have an active Canva account. You can access your account anytime at [email or web address] Did you know that you can access your hospital and ER discharge instructions at any time in Canva? You can also review all of your test results from your hospital stay or ER visit. Additional Information If you have questions, please visit the Frequently Asked Questions section of the Canva website at [email or web address] Remember, Canva is NOT to be used for urgent needs. For medical emergencies, dial 911. Now available from your iPhone and Android! General Information Please provide this summary of care documentation to your next provider. Patient Signature:  ____________________________________________________________ Date:  ____________________________________________________________  
  
[facility] Provider Signature:  ____________________________________________________________ Date:  ____________________________________________________________

## 2017-09-11 NOTE — ED NOTES
Pt ringing out on alarm from restroom. Is actively having vaginal bleeding. Toilet bowel saturated with blood, unable to see bottom of bowel. MD advised. Pt assisted out of clothes and into gown and with as clean brief on. Pt assisted to wheelchair by nurse, and provided with blanket for comfort measures and privacy. IV initiated, blood drawn with IV start. Samples sent to lab for further testing. Fluids initiated. Pt transferred to L&D. Upon transfer, pt repeating that she is \"hot. \" Requested for everything to come off. [place] removed. Pt tugging at gown, advised to keep on for privacy while assisting to L&D in wheelchair. Upon arrival to L&D OB nurse at the bedside to meet ER nurses and pt in room. Pt insisted on taking a moment prior to moving into bed. Primary ED nurse left room, while ED charge to assist with helping pt in bed. Primary ED nurse to give report to primary OB nurse taking over care.

## 2017-09-11 NOTE — ROUTINE PROCESS
Idris Potts with photos of infant per mothers request. Pastoral Care present in room with patient during photo session.

## 2017-09-11 NOTE — PROGRESS NOTES
Admitted to labor from ER. With c/o abd. Cramping and bleeding. Dr. Nunu Jiménez confirm  IUFD by Ultrasound. assumed care and report from ER primary nurse. To bed. Dr. Nunu Jiménez at bedside. No family present at this time. Pt. Came from ER with 18g  cathlon iV with normal saline 1000 ml infusing    0445. New iv line in by jyoti Cassidy. The first line on left ante. Saline lock and the normal saline bag transfer to the new site. Verbal consents given by pt. For bld. and bld. Products, epid, treatments and vaginal delivery. Verbalized understanding of instr. 7512. . Baby delivered. 0503. poc explained. Morphine 6 mg sivp given with intro. verbalized understanding of instr. siderails up x 2 and call light within reach. 0510. Resting quietly in bed with eye closed asleep. Easily aroused. 1436. Passed some mod. Amt. Of lochia and clots. Waiting on placenta. Iv lr with pitocin infusing. 7702. Dr. Timothy Arias back to . Pt deliver placenta. Dr. Nunu Jiménez assess. Pt. And placenta. L/o to send placenta to lab. 0530. poc explained. Pericare done. Underpad, ice pack, gown and be sheets changed. Smooth. Well. Rec. Started. Assess. Done. 4935. Repositioned left lat. 0455. Iv Lr 1000 ml with 20 units pitocin decreased to 125 ml/hr    0650. A friend her to visit pt.    Hermilo Torres.  at bedside. 9026. Up on bedpan. 0710. Off bedpan.  Voided 150 ml

## 2017-09-11 NOTE — PROGRESS NOTES
Bedside shift change report given to TWYLA Rosado RN (oncoming nurse) by TWYLA Fagan RN (offgoing nurse). Report included the following information SBAR.

## 2017-09-11 NOTE — PROCEDURES
Procedure Note    Patient was transferred from the emergency room after she felt some leaking and was found to be bleeding. When IV's were placed and running; depends was taken off and there was the fetus laying in the bed. The cord was clamped and cut and fetus was shown to the patient. Placenta still in place. Morphine to be given to patient and then will start pitocin. Justin Gross, DO    Addendum:  Placenta delivered at 46, appears intact. Vaginal exam done and cerclage stitch could be felt; pt prefers to have it removed later, would just like to rest right now.     Justin Murrays, DO

## 2017-09-11 NOTE — ED PROVIDER NOTES
HPI Comments: 33 yo  @ 17 wks with PMH of HTN who presents for evaluation of abdominal pain. She states that around 11 PM last night she started having sharp shooting abdominal pain that feels like labor pains. The pains are becoming more forceful and closer together. Patient reports that she has a history of incompetent cervix with multiple 2nd trimester losses. Did have a cerclage placed approximately 3.5 weeks ago. Was seen 3 days ago in by her OB for leakage of fluid and was put on bedrest at that time. She denies any vaginal bleeding or discharge at present. Does endorse minimal leakage of fluid when standing. Denies recent illness, no fever/vomiting/diarrhea. OB/GYN: Bharathi Stacy    Patient is a 32 y.o. female presenting with abdominal pain. The history is provided by the patient. Abdominal Pain    This is a new problem. The current episode started 3 to 5 hours ago. The problem has been gradually worsening. The pain is located in the generalized abdominal region. The quality of the pain is cramping, colicky and shooting. Associated symptoms include back pain. Pertinent negatives include no fever, no diarrhea, no nausea, no vomiting, no dysuria and no headaches. Past Medical History:   Diagnosis Date    Anemia NEC     with pregnancy. Taking IRON    Essential hypertension     Hypertension     Infertility     cerclage, prev sab    Ovarian cyst        Past Surgical History:   Procedure Laterality Date    HX GYN      Circlage    HX LAP CHOLECYSTECTOMY           Family History:   Problem Relation Age of Onset    Stroke Mother     Diabetes Father     Hypertension Father     Diabetes Sister        Social History     Social History    Marital status: SINGLE     Spouse name: N/A    Number of children: N/A    Years of education: N/A     Occupational History    Not on file.      Social History Main Topics    Smoking status: Former Smoker    Smokeless tobacco: Never Used Comment: one or two cigarettes a month    Alcohol use Yes      Comment: occasional    Drug use: No      Comment: daily    Sexual activity: Yes     Partners: Male     Birth control/ protection: None     Other Topics Concern    Not on file     Social History Narrative         ALLERGIES: Review of patient's allergies indicates no known allergies. Review of Systems   Constitutional: Positive for activity change. Negative for fever. HENT: Negative for rhinorrhea and sore throat. Respiratory: Negative for cough and shortness of breath. Gastrointestinal: Positive for abdominal pain. Negative for diarrhea, nausea and vomiting. Endocrine: Negative for polyuria. Genitourinary: Positive for pelvic pain. Negative for dysuria, vaginal bleeding and vaginal discharge. Musculoskeletal: Positive for back pain. Negative for joint swelling. Skin: Negative for rash. Neurological: Negative for light-headedness and headaches. Psychiatric/Behavioral: Negative for confusion. The patient is nervous/anxious. Vitals:    17 0317   BP: (!) 136/103   Pulse: 98   Resp: 18   SpO2: 100%            Physical Exam   Constitutional: She is oriented to person, place, and time. She appears well-developed and well-nourished. She appears distressed (moderate). HENT:   Head: Normocephalic. Abdominal: Soft. Gravid uterus just below level of umbilicus, Uterus firm but nontender to palpation when patient is not having pain   Musculoskeletal: She exhibits no edema. Neurological: She is alert and oriented to person, place, and time. Skin: No rash noted. She is not diaphoretic. Psychiatric: She has a normal mood and affect. Her behavior is normal.   Nursing note and vitals reviewed.        MDM  Number of Diagnoses or Management Options   in second trimester:   Diagnosis management comments: Ddx:  Spontaneous , PPROM, Septic , Infection, Abdominal Pain    33 yo  @ 17 wks with PMH of HTN who presents for evaluation of abdominal pain. VS sig for HTN, other VS WNL. Patient presents with story concerning for spontaneous  in setting of multiple  losses with similar presentation at gestational age. Fetal heart tones not found on doppler, no cardiac flicker on US. Patient with h/o leakage of fluid so will defer speculum exam to OB 2/2 infection risk. Patient's pain is intermittent and crampy, when she is not having pain, abdomen is soft and nontender. OB called for bedside evaluation. Labs including CBC, BMP, and type and screen sent. Amount and/or Complexity of Data Reviewed  Clinical lab tests: reviewed and ordered  Review and summarize past medical records: yes  Discuss the patient with other providers: yes (OB hospitalist)        Procedures           Chief Complaint   Patient presents with    Abdominal Pain     intermittent lower abd pain that patient describes as contractions, pt is 17 weeks pregnant; denies vaginal bleeding or fluid loss       4:08 AM  The patients presenting problems have been discussed, and they are in agreement with the care plan formulated and outlined with them. I have encouraged them to ask questions as they arise throughout their visit. MEDICATIONS GIVEN:  Medications   morphine injection 6 mg (not administered)   sodium chloride 0.9 % bolus infusion 1,000 mL (not administered)   sodium chloride (NS) flush 5-10 mL (not administered)   sodium chloride (NS) flush 5-10 mL (not administered)       LABS REVIEWED:  No results found for this or any previous visit (from the past 24 hour(s)). VITAL SIGNS:  Patient Vitals for the past 12 hrs:   Pulse Resp BP SpO2   17 0317 98 18 (!) 136/103 100 %         CONSULTATIONS:   OB/Gyn    CONSULT NOTE:   3:40 AM  Genna Quintanilla MD spoke with Dr. Renay Palomino,   Specialty: [facility] Hospitalist  Discussed pt's hx, disposition, and available diagnostic and imaging results. Reviewed care plans.  Consultant will evaluate pt in the ED. Written by Justin Holliday, ED Scribe, as dictated by Mike Starks MD.    PROGRESS NOTES:  0425- OB/Gyn hospitialist at bedside. Patient to be admitted upstairs. Agrees with plan for pain medication. Will give morphine   0430- Patient went to restroom and started having vaginal bleeding. IV access was obtained and patient 1L of fluid was hanging, patient taken urgently upstairs to L&D    DIAGNOSIS:    1.  in second trimester        PLAN:   1. Transfer to L&D    I personally saw and examined the patient. I have reviewed and agree with the residents findings, including all diagnostic interpretations, and plans as written. I was present during the key portions of separately billed procedures.   Shay Todd MD

## 2017-09-11 NOTE — PROGRESS NOTES
Post-Partum Day Number 1 Progress Note    [facility]     Information for the patient's :  [place], Pending  [[id number]]    Patient doing well without significant complaint. Voiding without difficulty, normal lochia. Vitals:    Visit Vitals    /83    Pulse 78    Temp 97.3 °F (36.3 °C)    Resp 18    SpO2 91%     Temp (24hrs), Av.3 °F (36.3 °C), Min:97.3 °F (36.3 °C), Max:97.3 °F (36.3 °C)          Exam:  Patient without distress. Abdomen soft, fundus firm, nontender                Perineum with normal lochia noted. Lower extremities are negative for swelling, cords or tenderness.     Labs:     Lab Results   Component Value Date/Time    WBC 12.6 2017 04:20 AM    WBC 9.0 2017 08:54 AM    WBC 10.7 2017 02:20 PM    WBC 11.0 2016 12:26 PM    WBC 12.7 2016 08:41 AM    WBC 11.5 2016 05:27 AM    WBC 12.0 2016 04:38 AM    WBC 10.8 2016 04:40 PM    WBC 9.6 01/15/2016 09:08 PM    WBC 11.3 2016 08:58 AM    WBC 11.6 2015 03:52 PM    WBC 8.0 2015 11:10 PM    WBC 8.3 2015 06:53 PM    WBC 7.9 2015 01:30 PM    WBC 8.0 2014 03:35 AM    WBC 10.1 2013 02:15 AM    WBC 9.7 2013 06:45 PM    WBC 7.9 2013 09:48 AM    WBC 6.8 2012 05:35 PM    WBC 8.3 2012 07:30 PM    WBC 12.3 2011 09:16 PM    WBC 8.5 2010 12:35 AM    WBC 7.7 2009 09:50 PM    HGB 11.8 2017 04:20 AM    HGB 11.5 2017 08:54 AM    HGB 12.7 2017 02:20 PM    HGB 8.4 2016 12:26 PM    HGB 10.6 2016 08:41 AM    HGB 10.3 2016 05:27 AM    HGB 10.7 2016 04:38 AM    HGB 12.2 2016 04:40 PM    HGB 11.8 01/15/2016 09:08 PM    HGB 12.5 2016 08:58 AM    HGB 12.6 2015 03:52 PM    HGB 10.6 2015 11:10 PM    HGB 11.5 2015 06:53 PM    HGB 10.9 2015 01:30 PM    HGB 11.0 2014 03:35 AM    HGB 10.5 2013 02:15 AM    HGB 12.4 02/27/2013 06:45 PM    HGB 12.2 02/13/2013 09:48 AM    HGB 11.2 01/19/2012 05:35 PM    HGB 12.3 01/11/2012 07:30 PM    HGB 13.0 06/16/2011 09:16 PM    HGB 12.3 06/11/2010 12:35 AM    HGB 12.0 05/04/2009 09:50 PM    HCT 35.5 09/11/2017 04:20 AM    HCT 34.7 08/25/2017 08:54 AM    HCT 38.6 06/21/2017 02:20 PM    HCT 24.8 03/09/2016 12:26 PM    HCT 32.3 03/08/2016 08:41 AM    HCT 30.8 03/07/2016 05:27 AM    HCT 32.5 03/06/2016 04:38 AM    HCT 36.2 02/09/2016 04:40 PM    HCT 36.4 01/15/2016 09:08 PM    HCT 38.8 01/03/2016 08:58 AM    HCT 38.0 12/30/2015 03:52 PM    HCT 33.1 09/17/2015 11:10 PM    HCT 37.0 05/06/2015 06:53 PM    HCT 34.4 04/01/2015 01:30 PM    HCT 33.5 08/29/2014 03:35 AM    HCT 32.5 03/11/2013 02:15 AM    HCT 38.3 02/27/2013 06:45 PM    HCT 37.8 02/13/2013 09:48 AM    HCT 34.5 01/19/2012 05:35 PM    HCT 37.4 01/11/2012 07:30 PM    HCT 38.9 06/16/2011 09:16 PM    HCT 38.2 06/11/2010 12:35 AM    HCT 35.9 05/04/2009 09:50 PM    PLATELET 877 32/48/0900 04:20 AM    PLATELET 830 78/24/9321 08:54 AM    PLATELET 192 71/12/4083 02:20 PM    PLATELET 567 02/06/3561 12:26 PM    PLATELET 583 06/68/1722 08:41 AM    PLATELET 058 83/74/5867 05:27 AM    PLATELET 038 94/30/3478 04:38 AM    PLATELET 754 04/08/5605 04:40 PM    PLATELET 126 08/26/3501 09:08 PM    PLATELET 225 33/26/5672 08:58 AM    PLATELET 588 59/74/8835 03:52 PM    PLATELET 456 41/33/4947 11:10 PM    PLATELET 948 91/90/3521 06:53 PM    PLATELET 257 71/75/9641 01:30 PM    PLATELET 522 41/68/0169 03:35 AM    PLATELET 681 31/23/7067 02:15 AM    PLATELET 311 16/08/5908 06:45 PM    PLATELET 612 37/06/7599 09:48 AM    PLATELET 327 08/82/9240 05:35 PM    PLATELET 862 37/20/5339 07:30 PM    PLATELET 425 29/36/3909 09:16 PM    PLATELET 597 59/08/4786 12:35 AM    PLATELET 045 97/32/8875 09:50 PM       Recent Results (from the past 24 hour(s))   CBC WITH AUTOMATED DIFF    Collection Time: 09/11/17  4:20 AM   Result Value Ref Range    WBC 12.6 (H) 3.6 - 11.0 K/uL RBC 4.49 3.80 - 5.20 M/uL    HGB 11.8 11.5 - 16.0 g/dL    HCT 35.5 35.0 - 47.0 %    MCV 79.1 (L) 80.0 - 99.0 FL    MCH 26.3 26.0 - 34.0 PG    MCHC 33.2 30.0 - 36.5 g/dL    RDW 14.1 11.5 - 14.5 %    PLATELET 936 813 - 273 K/uL    NEUTROPHILS 79 (H) 32 - 75 %    LYMPHOCYTES 14 12 - 49 %    MONOCYTES 6 5 - 13 %    EOSINOPHILS 1 0 - 7 %    BASOPHILS 0 0 - 1 %    ABS. NEUTROPHILS 10.0 (H) 1.8 - 8.0 K/UL    ABS. LYMPHOCYTES 1.8 0.8 - 3.5 K/UL    ABS. MONOCYTES 0.8 0.0 - 1.0 K/UL    ABS. EOSINOPHILS 0.1 0.0 - 0.4 K/UL    ABS. BASOPHILS 0.0 0.0 - 0.1 K/UL   METABOLIC PANEL, BASIC    Collection Time: 09/11/17  4:20 AM   Result Value Ref Range    Sodium 137 136 - 145 mmol/L    Potassium 3.3 (L) 3.5 - 5.1 mmol/L    Chloride 104 97 - 108 mmol/L    CO2 25 21 - 32 mmol/L    Anion gap 8 5 - 15 mmol/L    Glucose 88 65 - 100 mg/dL    BUN 8 6 - 20 MG/DL    Creatinine 0.69 0.55 - 1.02 MG/DL    BUN/Creatinine ratio 12 12 - 20      GFR est AA >60 >60 ml/min/1.73m2    GFR est non-AA >60 >60 ml/min/1.73m2    Calcium 8.8 8.5 - 10.1 MG/DL   TYPE + CROSSMATCH    Collection Time: 09/11/17  4:20 AM   Result Value Ref Range    Crossmatch Expiration 09/14/2017     ABO/Rh(D) O POSITIVE     Antibody screen NEG     Unit number [id number]     Blood component type RC LR AS1     Unit division 00     Status of unit ALLOCATED     Crossmatch result Compatible     Unit number [id number]     Blood component type RC LR AS3,1     Unit division 00     Status of unit ALLOCATED     Crossmatch result Compatible    FIBRINOGEN    Collection Time: 09/11/17  5:56 AM   Result Value Ref Range    Fibrinogen 431 200 - 475 mg/dL   PROTHROMBIN TIME + INR    Collection Time: 09/11/17  5:56 AM   Result Value Ref Range    INR 1.0 0.9 - 1.1      Prothrombin time 9.8 9.0 - 11.1 sec       Assessment: Doing well, post partum day 0  17 weeker delivery earlier this am    Plan:  1. Continue routine postpartum and perineal care as well as maternal education.

## 2017-09-11 NOTE — PROGRESS NOTES
Supportive follow-up visit for pt. Pt had pictures being taken of her baby (Kolton Juarez). She shared that she wants as many pictures as possible and will be keeping a box for her for the memories. Pt shared that she has good support from the baby's father. Pt has a 11year old daughter but had lost 2 other babies before birth. Pt mentioned that Kolton Juarez was the last one for her and would not like to try again because it'll be too painful. Pt mentioned that she is Tiki Stall and that her doretha keeps her going as well as her 11year old daughter. Pt appreciates  support. Reminded of availability and assured her of continued support as needed/ desired. Cisco Taylor M.S.   Spiritual Care Department  If needs rise please call [phone])

## 2017-09-11 NOTE — ED NOTES
Pt resting in stretcher. Call bell within reach. Pt reporting started to have pelvic cramping over the past 3 hours, worsening around 0200. Pain is a cramping 10/10 intermittent pain. Denies any nausea, vomiting, or bleeding.

## 2017-09-11 NOTE — ROUTINE PROCESS
Bedside and Verbal shift change report given to BIJAN Christensen RN (oncoming nurse) by Brian Castillo. Obdulio Matthew RN (offgoing nurse). Report included the following information SBAR.

## 2017-09-11 NOTE — PROGRESS NOTES
Spiritual Care Assessment/Progress Notes    Miguel Gotti [id number]  [id number]    1985  32 y.o.  female    Patient Telephone Number: [phone] (home)   Pentecostal Affiliation: [facility]   Language: English   Extended Emergency Contact Information  Primary Emergency Contact: [address] Phone: [phone]  Relation: Sister   Patient Active Problem List    Diagnosis Date Noted    Intrauterine fetal death in pregnancy 09/11/2017    Cervical incompetence affecting management of pregnancy in second trimester, antepartum 03/06/2016    Chronic hypertension complicating or reason for care during pregnancy 02/09/2016        Date: 9/11/2017       Level of Pentecostal/Spiritual Activity:  []         Involved in doretha tradition/spiritual practice    []         Not involved in doretha tradition/spiritual practice  []         Spiritually oriented    []         Claims no spiritual orientation    []         seeking spiritual identity  []         Feels alienated from Restoration practice/tradition  []         Feels angry about Restoration practice/tradition  []         Spirituality/Restoration tradition  a resource for coping at this time.   [x]         Not able to assess due to medical condition    Services Provided Today:  []         crisis intervention    []         reading Scriptures  [x]         spiritual assessment    []         prayer  [x]         empathic listening/emotional support  []         rites and rituals (cite in comments)  []         life review     []         Restoration support  []         theological development   []         advocacy  []         ethical dialog     []         blessing  []         bereavement support    []         support to family  []         anticipatory grief support   []         help with AMD  []         spiritual guidance    []         meditation      Spiritual Care Needs  []         Emotional Support  []         Spiritual/Pentecostal Care  [] Loss/Adjustment  []         Advocacy/Referral                /Ethics  [x]         No needs expressed at               this time  []         Other: (note in               comments)  Spiritual Care Plan  []         Follow up visits with               pt/family  []         Provide materials  []         Schedule sacraments  []         Contact Community               Clergy  [x]         Follow up as needed  []         Other: (note in               comments)     Comments:  responded to fetal demise in L&D. No fam present. A friend of the pt was bedside providing support.  provided empathic listening and supportive presence for them. The pt was teary and grieving and did not share much to . No needs were expressed at this time. Advised of availability and assured her of continued support as needed/ desired. Barb Washington. M.S.   Spiritual Care Department  If needs rise please call [phone])

## 2017-09-11 NOTE — H&P
History & Physical    Name: Olinda Chandra MRN: [id number]  SSN: [id number]    YOB: 1985  Age: 32 y.o. Sex: female        Subjective:     Estimated Date of Delivery: 18  OB History    Para Term  AB Living   5 1 1  1 1   SAB TAB Ectopic Molar Multiple Live Births   1     1      # Outcome Date GA Lbr Matheus/2nd Weight Sex Delivery Anes PTL Lv   5 Current            4 Term 12 39w0d 12:00 / 00:09 3.035 kg F VAGINAL DELI OTHER (COMME N JOANN   3 SAB 2011 19w0d          2             1                Birth Comments: System Generated. Please review and update pregnancy details. Ms. Marleny Grande is 27yo [id number] with an IUFD at 17w2d. Prenatal course was complicated by cerclage placement 3 weeks ago. Patient reports she started to have some leaking approx 2 weeks ago and was diagnosed with a UTI. She then went and saw her provider this past Friday and an ultrasound was done and she was told that the fluid was low and was put on bedrest.  She reports feeling movement until 11pm last evening. She denies any vaginal bleeding but continues to have some leaking. Patient presented to the ER this evening with abdominal pain which started earlier in the evening; she reports contractions every few minutes. Please see prenatal records for details. PObHx: 1st pregnancy was a loss at 20wks                 2nd pregnancy pt had a cerclage and had a full term delivery                 3rd pregnancy was an SAB at 6 weeks                 4th pregnancy was an SAB at 15 wks, approx 1 week after a cerclage was placed                 5th pregnancy current    PMHx: htn  PSHx: cerclage placement; cholecystectomy  Meds: labetalol 200mg bid    PN labs:  O pos  Rubella Immune  HBsAg neg  RPR NR  HIV NR  CF neg  Normal Hgb electrophoresis  Past Medical History:   Diagnosis Date    Anemia NEC     with pregnancy.  Taking IRON    Essential hypertension     Hypertension     Infertility     cerclage, prev sab    Ovarian cyst      Past Surgical History:   Procedure Laterality Date    HX GYN      Circlage    HX LAP CHOLECYSTECTOMY  2008     Social History     Occupational History    Not on file. Social History Main Topics    Smoking status: Former Smoker    Smokeless tobacco: Never Used      Comment: one or two cigarettes a month    Alcohol use Yes      Comment: occasional    Drug use: No      Comment: daily    Sexual activity: Yes     Partners: Male     Birth control/ protection: None     Family History   Problem Relation Age of Onset    Stroke Mother     Diabetes Father     Hypertension Father     Diabetes Sister        No Known Allergies  Prior to Admission medications    Not on File        Review of Systems: A comprehensive review of systems was negative except for that written in the HPI. Objective:     Vitals:  Vitals:    09/11/17 0317   BP: (!) 136/103   Pulse: 98   Resp: 18   SpO2: 100%        Physical Exam:  Patient without distress but having irregular contractions and uncomfortable with them  Membranes:  Appear to be ruptured; unsure exactly when  Fetal Heart Rate: no fetal heart detected on ultrasound  Bedside ultrasound: no fluid seen around fetus; no heartbeat detected    Prenatal Labs:   Lab Results   Component Value Date/Time    ABO/Rh(D) O POSITIVE 12/30/2015 03:52 PM    Rubella, External immune 06/28/2011    GrBStrep, External pos 12/23/2011    HBsAg, External negative 06/28/2011    HIV, External non reactive 06/28/2011    RPR, External non reactive 06/28/2011    ABO,Rh O pos 06/28/2011         Assessment/Plan:     Active Problems:    * No active hospital problems.  *     25yo [id number] @ 17w1d with an IUFD    Plan:   -reviewed ultrasound findings with patient; patient appropriately tearful and trying to get hold of somebody to come in for support  -admit/transfer to Labor & Delivery  -will complete evaluation when patient arrives on Labor & Delivery      Signed By:  Mary Peck, DO     September 11, 2017

## 2017-09-11 NOTE — PROGRESS NOTES
Bedside shift change report given to PUNEET Holder RN (oncoming nurse) by TWYLA Denney RN (offgoing nurse). Report included the following information SBAR.

## 2017-09-12 VITALS
TEMPERATURE: 98.1 F | WEIGHT: 175 LBS | SYSTOLIC BLOOD PRESSURE: 130 MMHG | HEART RATE: 76 BPM | BODY MASS INDEX: 26.52 KG/M2 | RESPIRATION RATE: 16 BRPM | OXYGEN SATURATION: 91 % | DIASTOLIC BLOOD PRESSURE: 84 MMHG | HEIGHT: 68 IN

## 2017-09-12 PROCEDURE — 74011250637 HC RX REV CODE- 250/637: Performed by: OBSTETRICS & GYNECOLOGY

## 2017-09-12 PROCEDURE — 74011250637 HC RX REV CODE- 250/637: Performed by: SPECIALIST

## 2017-09-12 PROCEDURE — 0UCC7ZZ EXTIRPATION OF MATTER FROM CERVIX, VIA NATURAL OR ARTIFICIAL OPENING: ICD-10-PCS | Performed by: OBSTETRICS & GYNECOLOGY

## 2017-09-12 RX ORDER — IBUPROFEN 800 MG/1
800 TABLET ORAL
Qty: 30 TAB | Refills: 0 | Status: SHIPPED | OUTPATIENT
Start: 2017-09-12 | End: 2018-06-27

## 2017-09-12 RX ORDER — OXYCODONE AND ACETAMINOPHEN 5; 325 MG/1; MG/1
1 TABLET ORAL
Qty: 20 TAB | Refills: 0 | Status: SHIPPED | OUTPATIENT
Start: 2017-09-12 | End: 2018-06-27

## 2017-09-12 RX ADMIN — IBUPROFEN 800 MG: 400 TABLET, FILM COATED ORAL at 09:15

## 2017-09-12 RX ADMIN — IBUPROFEN 800 MG: 400 TABLET, FILM COATED ORAL at 00:55

## 2017-09-12 RX ADMIN — LABETALOL HYDROCHLORIDE 200 MG: 200 TABLET, FILM COATED ORAL at 09:15

## 2017-09-12 NOTE — PROGRESS NOTES
Post-Partum Day Number 2 Progress Note    Therese Rogers     Information for the patient's :  Yazan Lanier, Pending  FD [[id number]]   Vaginal, Spontaneous Delivery   Patient doing well without significant complaint. Voiding without difficulty, normal lochia. Vitals:    Visit Vitals    /65 (BP 1 Location: Right arm, BP Patient Position: At rest)    Pulse 63    Temp 97.9 °F (36.6 °C)    Resp 16    Ht 5' 8\" (1.727 m)    Wt 79.4 kg (175 lb)    SpO2 91%    BMI 26.61 kg/m2     Temp (24hrs), Av.1 °F (36.7 °C), Min:97.9 °F (36.6 °C), Max:98.2 °F (36.8 °C)          Exam:         Patient without distress. Abdomen soft, fundus firm, nontender                Lower extremities are negative for swelling, cords or tenderness.     Labs:     Lab Results   Component Value Date/Time    WBC 12.6 2017 04:20 AM    WBC 9.0 2017 08:54 AM    WBC 10.7 2017 02:20 PM    WBC 11.0 2016 12:26 PM    WBC 12.7 2016 08:41 AM    WBC 11.5 2016 05:27 AM    WBC 12.0 2016 04:38 AM    WBC 10.8 2016 04:40 PM    WBC 9.6 01/15/2016 09:08 PM    WBC 11.3 2016 08:58 AM    WBC 11.6 2015 03:52 PM    WBC 8.0 2015 11:10 PM    WBC 8.3 2015 06:53 PM    WBC 7.9 2015 01:30 PM    WBC 8.0 2014 03:35 AM    WBC 10.1 2013 02:15 AM    WBC 9.7 2013 06:45 PM    WBC 7.9 2013 09:48 AM    WBC 6.8 2012 05:35 PM    WBC 8.3 2012 07:30 PM    WBC 12.3 2011 09:16 PM    WBC 8.5 2010 12:35 AM    WBC 7.7 2009 09:50 PM    HGB 11.8 2017 04:20 AM    HGB 11.5 2017 08:54 AM    HGB 12.7 2017 02:20 PM    HGB 8.4 2016 12:26 PM    HGB 10.6 2016 08:41 AM    HGB 10.3 2016 05:27 AM    HGB 10.7 2016 04:38 AM    HGB 12.2 2016 04:40 PM    HGB 11.8 01/15/2016 09:08 PM    HGB 12.5 2016 08:58 AM    HGB 12.6 2015 03:52 PM    HGB 10.6 2015 11:10 PM    HGB 11.5 2015 06:53 PM    HGB 10.9 04/01/2015 01:30 PM    HGB 11.0 08/29/2014 03:35 AM    HGB 10.5 03/11/2013 02:15 AM    HGB 12.4 02/27/2013 06:45 PM    HGB 12.2 02/13/2013 09:48 AM    HGB 11.2 01/19/2012 05:35 PM    HGB 12.3 01/11/2012 07:30 PM    HGB 13.0 06/16/2011 09:16 PM    HGB 12.3 06/11/2010 12:35 AM    HGB 12.0 05/04/2009 09:50 PM    HCT 35.5 09/11/2017 04:20 AM    HCT 34.7 08/25/2017 08:54 AM    HCT 38.6 06/21/2017 02:20 PM    HCT 24.8 03/09/2016 12:26 PM    HCT 32.3 03/08/2016 08:41 AM    HCT 30.8 03/07/2016 05:27 AM    HCT 32.5 03/06/2016 04:38 AM    HCT 36.2 02/09/2016 04:40 PM    HCT 36.4 01/15/2016 09:08 PM    HCT 38.8 01/03/2016 08:58 AM    HCT 38.0 12/30/2015 03:52 PM    HCT 33.1 09/17/2015 11:10 PM    HCT 37.0 05/06/2015 06:53 PM    HCT 34.4 04/01/2015 01:30 PM    HCT 33.5 08/29/2014 03:35 AM    HCT 32.5 03/11/2013 02:15 AM    HCT 38.3 02/27/2013 06:45 PM    HCT 37.8 02/13/2013 09:48 AM    HCT 34.5 01/19/2012 05:35 PM    HCT 37.4 01/11/2012 07:30 PM    HCT 38.9 06/16/2011 09:16 PM    HCT 38.2 06/11/2010 12:35 AM    HCT 35.9 05/04/2009 09:50 PM    PLATELET 565 48/50/0903 04:20 AM    PLATELET 882 13/35/4054 08:54 AM    PLATELET 032 77/96/6215 02:20 PM    PLATELET 338 30/31/8886 12:26 PM    PLATELET 638 88/38/4916 08:41 AM    PLATELET 038 67/14/2957 05:27 AM    PLATELET 607 81/22/4252 04:38 AM    PLATELET 916 90/96/0997 04:40 PM    PLATELET 777 98/36/3913 09:08 PM    PLATELET 255 20/19/8906 08:58 AM    PLATELET 050 50/37/9977 03:52 PM    PLATELET 733 22/02/5236 11:10 PM    PLATELET 865 26/09/1724 06:53 PM    PLATELET 397 77/03/7589 01:30 PM    PLATELET 558 21/39/9365 03:35 AM    PLATELET 769 92/16/4713 02:15 AM    PLATELET 131 84/23/0994 06:45 PM    PLATELET 366 42/82/4348 09:48 AM    PLATELET 888 45/88/6998 05:35 PM    PLATELET 609 59/22/0281 07:30 PM    PLATELET 631 21/73/4735 09:16 PM    PLATELET 478 83/59/7377 12:35 AM    PLATELET 406 60/74/2047 09:50 PM       No results found for this or any previous visit (from the past 24 hour(s)). Assessment: Doing well, post partum day 2      Plan:   1. Discharge home today  2. Follow up in office in 6 weeks with   3. Post partum activity advised, diet as tolerated  4.  Discharge Medications: ibuprofen, percocet and medications prior to admission

## 2017-09-12 NOTE — DISCHARGE INSTRUCTIONS
POST PARTUM/ LOSS DISCHARGE INSTRUCTION    Post partum process includes:    Afterbirth Pains:   Abdominal pains often feel like contractions or strong menstrual cramps. Pain should decrease after three days but can last up to two weeks. Deep breathing and/or use of a heating pad may relieve these symptoms. You may also use over the counter or prescribed pain medication as directed by your CNM/physician. Vaginal Discharge:   Vaginal discharge may be heavy red with clots. The flow should gradually decrease and change to pink, then brown, followed by a yellowish discharge. This may continue for two to six weeks. Use only pads, no tampons, until seen by your physician post discharge. Breast care:   1)  Breasts may fill with milk around the third day after delivery. This can be a painful   reminder of your loss. This can also be physically uncomfortable. 2)  Wear a well supporting bra, breast binder, or ace bandage around the breasts 24                      hours a day until breasts return to normal.  3)  Application of ice packs two times a day under each arm may relieve engorgement. 4)  Do not express, pump, or use heat to relieve engorgement as this will only increase                      milk production. 5)  Use care in the shower as this may stimulate milk let down. Emotional Care:  Be aware of the physical and psychological symptoms following loss. Information for you is located in the grief packet.     Stages of Grief:  1)  Denial  2)  Anger  3)  Bargaining  4)  Depression  5)  Acceptance    Call your doctor for the following:  Fever over 101 degrees by mouth  Vaginal bleeding heavier than a normal menstrual period or clots larger than golf ball size  Red streaks in or increased swelling of legs  Red streaks or lumps in breasts, or severe breast pain  Painful urination, constipation  Increased pain, swelling, drainage, or foul odor from your episiotomy, laceration, or incision  If you feel extremely anxious or overwhelmed  If you have thoughts of harming yourself or others

## 2017-09-12 NOTE — PROGRESS NOTES
Problem: Falls - Risk of  Goal: *Absence of Falls  Document Vonnie Fall Risk and appropriate interventions in the flowsheet.    Outcome: Progressing Towards Goal  Fall Risk Interventions:              Medication Interventions: Patient to call before getting OOB, Teach patient to arise slowly

## 2017-09-12 NOTE — PROCEDURES
CERCLAGE REMOVAL  Consent on the chart. Pt positioned in dorsal lithotomy position, Sterile speculum placed. Betadine prep. Cerclage visualized. Grasped with sponge forceps and cut and removed. Stitch knot visualized. Cerclage was intact. Pt tolerated procedure well. Minimal bleeding.

## 2017-09-12 NOTE — PROGRESS NOTES
Discharge instructions and prescriptions given to pt. Understanding voiced. Discharged with belongings and infant memory box. Taken ambulatory, per pt request, to vehicle. Infant taken to [facility] by TWYLA Paulson RN.

## 2017-09-12 NOTE — ROUTINE PROCESS
Bedside and Verbal shift change report given to ESTELLA López (oncoming nurse) by Sultana Henriquez RN (offgoing nurse).  Report included the following information SBAR.

## 2017-09-13 LAB
ABO + RH BLD: NORMAL
BLD PROD TYP BPU: NORMAL
BLD PROD TYP BPU: NORMAL
BLOOD GROUP ANTIBODIES SERPL: NORMAL
BPU ID: NORMAL
BPU ID: NORMAL
CROSSMATCH RESULT,%XM: NORMAL
CROSSMATCH RESULT,%XM: NORMAL
SPECIMEN EXP DATE BLD: NORMAL
STATUS OF UNIT,%ST: NORMAL
STATUS OF UNIT,%ST: NORMAL
UNIT DIVISION, %UDIV: 0
UNIT DIVISION, %UDIV: 0

## 2018-06-27 ENCOUNTER — HOSPITAL ENCOUNTER (EMERGENCY)
Age: 33
Discharge: HOME | End: 2018-06-27
Attending: EMERGENCY MEDICINE
Payer: SELF-PAY

## 2018-06-27 VITALS
HEIGHT: 67 IN | RESPIRATION RATE: 12 BRPM | HEART RATE: 74 BPM | WEIGHT: 174.82 LBS | OXYGEN SATURATION: 100 % | BODY MASS INDEX: 27.44 KG/M2 | SYSTOLIC BLOOD PRESSURE: 157 MMHG | TEMPERATURE: 98.7 F | DIASTOLIC BLOOD PRESSURE: 103 MMHG

## 2018-06-27 DIAGNOSIS — J02.9 ACUTE PHARYNGITIS, UNSPECIFIED ETIOLOGY: Primary | ICD-10-CM

## 2018-06-27 DIAGNOSIS — R03.0 ELEVATED BLOOD PRESSURE READING: ICD-10-CM

## 2018-06-27 PROCEDURE — 99282 EMERGENCY DEPT VISIT SF MDM: CPT

## 2018-06-27 RX ORDER — HYDROCHLOROTHIAZIDE 25 MG/1
25 TABLET ORAL DAILY
Qty: 14 TAB | Refills: 0 | Status: SHIPPED | OUTPATIENT
Start: 2018-06-27 | End: 2018-07-11

## 2018-06-27 RX ORDER — AMOXICILLIN 875 MG/1
875 TABLET, FILM COATED ORAL 2 TIMES DAILY
Qty: 14 TAB | Refills: 0 | Status: SHIPPED | OUTPATIENT
Start: 2018-06-27 | End: 2018-07-04

## 2018-06-27 RX ORDER — AMLODIPINE BESYLATE 10 MG/1
TABLET ORAL DAILY
COMMUNITY
End: 2018-08-17

## 2018-06-27 RX ORDER — IBUPROFEN 600 MG/1
600 TABLET ORAL
Qty: 20 TAB | Refills: 0 | Status: SHIPPED | OUTPATIENT
Start: 2018-06-27 | End: 2018-07-28

## 2018-06-27 NOTE — LETTER
[address] 
[facility] EMERGENCY DEPT 
[address] [place] [address] [address] [address] [phone] Work/School Note Date: 6/27/2018 To Whom It May concern: 
 
Trinh Astudillo was seen and treated today in the emergency room. She may return to work in 1 to 2 days, as symptoms improve. Sincerely, Willi Capone

## 2018-06-27 NOTE — ROUTINE PROCESS
PARRIS Hart reviewed discharge instructions with the patient. The patient verbalized understanding.   Alert and stable for discharge

## 2018-06-27 NOTE — DISCHARGE INSTRUCTIONS
Sore Throat: Care Instructions  Your Care Instructions    Infection by bacteria or a virus causes most sore throats. Cigarette smoke, dry air, air pollution, allergies, and yelling can also cause a sore throat. Sore throats can be painful and annoying. Fortunately, most sore throats go away on their own. If you have a bacterial infection, your doctor may prescribe antibiotics. Follow-up care is a key part of your treatment and safety. Be sure to make and go to all appointments, and call your doctor if you are having problems. It's also a good idea to know your test results and keep a list of the medicines you take. How can you care for yourself at home? · If your doctor prescribed antibiotics, take them as directed. Do not stop taking them just because you feel better. You need to take the full course of antibiotics. · Gargle with warm salt water once an hour to help reduce swelling and relieve discomfort. Use 1 teaspoon of salt mixed in 1 cup of warm water. · Take an over-the-counter pain medicine, such as acetaminophen (Tylenol), ibuprofen (Advil, Motrin), or naproxen (Aleve). Read and follow all instructions on the label. · Be careful when taking over-the-counter cold or flu medicines and Tylenol at the same time. Many of these medicines have acetaminophen, which is Tylenol. Read the labels to make sure that you are not taking more than the recommended dose. Too much acetaminophen (Tylenol) can be harmful. · Drink plenty of fluids. Fluids may help soothe an irritated throat. Hot fluids, such as tea or soup, may help decrease throat pain. · Use over-the-counter throat lozenges to soothe pain. Regular cough drops or hard candy may also help. These should not be given to young children because of the risk of choking. · Do not smoke or allow others to smoke around you. If you need help quitting, talk to your doctor about stop-smoking programs and medicines.  These can increase your chances of quitting for good.  · Use a vaporizer or humidifier to add moisture to your bedroom. Follow the directions for cleaning the machine. When should you call for help? Call your doctor now or seek immediate medical care if:  ? · You have new or worse trouble swallowing. ? · Your sore throat gets much worse on one side. ? Watch closely for changes in your health, and be sure to contact your doctor if you do not get better as expected. Where can you learn more? Go to http://molly-howie.info/. Enter 062 [id number] 80 [id number] in the search box to learn more about \"Sore Throat: Care Instructions. \"  Current as of: May 12, 2017  Content Version: 11.4  © 5815-7858 [employer] Doctor. Care instructions adapted under license by [employer] (which disclaims liability or warranty for this information). If you have questions about a medical condition or this instruction, always ask your healthcare professional. [place] [address] any warranty or liability for your use of this information. Strep Throat: Care Instructions  Your Care Instructions    Strep throat is a bacterial infection that causes sudden, severe sore throat and fever. Strep throat, which is caused by bacteria called streptococcus, is treated with antibiotics. Sometimes a strep test is necessary to tell if the sore throat is caused by strep bacteria. Treatment can help ease symptoms and may prevent future problems. Follow-up care is a key part of your treatment and safety. Be sure to make and go to all appointments, and call your doctor if you are having problems. It's also a good idea to know your test results and keep a list of the medicines you take. How can you care for yourself at home? · Take your antibiotics as directed. Do not stop taking them just because you feel better. You need to take the full course of antibiotics. · Strep throat can spread to others until 24 hours after you begin taking antibiotics.  During this time, you should avoid contact with other people at work or home, especially infants and children. Do not sneeze or cough on others, and wash your hands often. Keep your drinking glass and eating utensils separate from those of others, and wash these items well in hot, soapy water. · Gargle with warm salt water at least once each hour to help reduce swelling and make your throat feel better. Use 1 teaspoon of salt mixed in 8 fluid ounces of warm water. · Take an over-the-counter pain medication, such as acetaminophen (Tylenol), ibuprofen (Advil, Motrin), or naproxen (Aleve). Read and follow all instructions on the label. · Try an over-the-counter anesthetic throat spray or throat lozenges, which may help relieve throat pain. · Drink plenty of fluids. Fluids may help soothe an irritated throat. Hot fluids, such as tea or soup, may help your throat feel better. · Eat soft solids and drink plenty of clear liquids. Flavored ice pops, ice cream, scrambled eggs, sherbet, and gelatin dessert (such as Jell-O) may also soothe the throat. · Get lots of rest.  · Do not smoke, and avoid secondhand smoke. If you need help quitting, talk to your doctor about stop-smoking programs and medicines. These can increase your chances of quitting for good. · Use a vaporizer or humidifier to add moisture to the air in your bedroom. Follow the directions for cleaning the machine. When should you call for help? Call your doctor now or seek immediate medical care if:  ? · You have a new or higher fever. ? · You have a fever with a stiff neck or severe headache. ? · You have new or worse trouble swallowing. ? · Your sore throat gets much worse on one side. ? · Your pain becomes much worse on one side of your throat. ? Watch closely for changes in your health, and be sure to contact your doctor if:  ? · You are not getting better after 2 days (48 hours). ? · You do not get better as expected. Where can you learn more?   Go to http://molly-howie.info/. Enter K625 in the search box to learn more about \"Strep Throat: Care Instructions. \"  Current as of: May 12, 2017  Content Version: 11.4  © 7181-8563 [employer]. Care instructions adapted under license by [employer] (which disclaims liability or warranty for this information). If you have questions about a medical condition or this instruction, always ask your healthcare professional. [address] any warranty or liability for your use of this information. Elevated Blood Pressure: Care Instructions  Your Care Instructions    Blood pressure is a measure of how hard the blood pushes against the walls of your arteries. It's normal for blood pressure to go up and down throughout the day. But if it stays up over time, you have high blood pressure. Two numbers tell you your blood pressure. The first number is the systolic pressure. It shows how hard the blood pushes when your heart is pumping. The second number is the diastolic pressure. It shows how hard the blood pushes between heartbeats, when your heart is relaxed and filling with blood. An ideal blood pressure in adults is less than 120/80 (say \"120 over 80\"). High blood pressure is 140/90 or higher. You have high blood pressure if your top number is 140 or higher or your bottom number is 90 or higher, or both. The main test for high blood pressure is simple, fast, and painless. To diagnose high blood pressure, your doctor will test your blood pressure at different times. After testing your blood pressure, your doctor may ask you to test it again when you are home. If you are diagnosed with high blood pressure, you can work with your doctor to make a long-term plan to manage it. Follow-up care is a key part of your treatment and safety. Be sure to make and go to all appointments, and call your doctor if you are having problems.  It's also a good idea to know your test results and keep a list of the medicines you take. How can you care for yourself at home? · Do not smoke. Smoking increases your risk for heart attack and stroke. If you need help quitting, talk to your doctor about stop-smoking programs and medicines. These can increase your chances of quitting for good. · Stay at a healthy weight. · Try to limit how much sodium you eat to less than 2,300 milligrams (mg) a day. Your doctor may ask you to try to eat less than 1,500 mg a day. · Be physically active. Get at least 30 minutes of exercise on most days of the week. Walking is a good choice. You also may want to do other activities, such as running, swimming, cycling, or playing tennis or team sports. · Avoid or limit alcohol. Talk to your doctor about whether you can drink any alcohol. · Eat plenty of fruits, vegetables, and low-fat dairy products. Eat less saturated and total fats. · Learn how to check your blood pressure at home. When should you call for help? Call your doctor now or seek immediate medical care if:  ? · Your blood pressure is much higher than normal (such as 180/110 or higher). ? · You think high blood pressure is causing symptoms such as:  ¨ Severe headache. ¨ Blurry vision. ? Watch closely for changes in your health, and be sure to contact your doctor if:  ? · You do not get better as expected. Where can you learn more? Go to http://molly-howie.info/. Enter U660 in the search box to learn more about \"Elevated Blood Pressure: Care Instructions. \"  Current as of: September 21, 2016  Content Version: 11.4  © 5132-0620 NCR. Care instructions adapted under license by [employer] (which disclaims liability or warranty for this information).  If you have questions about a medical condition or this instruction, always ask your healthcare professional. [address] any warranty or liability for your use of this information.

## 2018-06-27 NOTE — Clinical Note
Rest, push fluids, warm salt water gargles, keep throat moist with lozenges, beverages, hard candy. Alternate Tylenol and Motrin for fever, and follow up with PCP for recheck of blood pressure and current symptoms. Avoid any and all tobacco products. Re turn to the emergency department for any worsening fever, cough, chest pain, shortness of breath, decreased oral intake, or decreased urine output.

## 2018-07-27 ENCOUNTER — HOSPITAL ENCOUNTER (EMERGENCY)
Age: 33
Discharge: LEFT BEFORE DISCHARGE | End: 2018-07-27
Attending: EMERGENCY MEDICINE
Payer: MEDICAID

## 2018-07-27 VITALS
DIASTOLIC BLOOD PRESSURE: 125 MMHG | WEIGHT: 183.42 LBS | TEMPERATURE: 98.1 F | SYSTOLIC BLOOD PRESSURE: 180 MMHG | RESPIRATION RATE: 16 BRPM | HEART RATE: 89 BPM | BODY MASS INDEX: 28.79 KG/M2 | OXYGEN SATURATION: 100 % | HEIGHT: 67 IN

## 2018-07-27 LAB
APPEARANCE UR: CLEAR
BACTERIA URNS QL MICRO: ABNORMAL /HPF
BILIRUB UR QL: NEGATIVE
COLOR UR: ABNORMAL
EPITH CASTS URNS QL MICRO: ABNORMAL /LPF
GLUCOSE UR STRIP.AUTO-MCNC: NEGATIVE MG/DL
HCG UR QL: NEGATIVE
HGB UR QL STRIP: NEGATIVE
KETONES UR QL STRIP.AUTO: NEGATIVE MG/DL
LEUKOCYTE ESTERASE UR QL STRIP.AUTO: ABNORMAL
NITRITE UR QL STRIP.AUTO: NEGATIVE
PH UR STRIP: 6.5 [PH] (ref 5–8)
PROT UR STRIP-MCNC: NEGATIVE MG/DL
RBC #/AREA URNS HPF: ABNORMAL /HPF (ref 0–5)
SP GR UR REFRACTOMETRY: 1.02 (ref 1–1.03)
TRICHOMONAS UR QL MICRO: PRESENT
UA: UC IF INDICATED,UAUC: ABNORMAL
UROBILINOGEN UR QL STRIP.AUTO: 1 EU/DL (ref 0.2–1)
WBC URNS QL MICRO: ABNORMAL /HPF (ref 0–4)

## 2018-07-27 PROCEDURE — 81001 URINALYSIS AUTO W/SCOPE: CPT | Performed by: EMERGENCY MEDICINE

## 2018-07-27 PROCEDURE — 99282 EMERGENCY DEPT VISIT SF MDM: CPT

## 2018-07-27 PROCEDURE — 81025 URINE PREGNANCY TEST: CPT

## 2018-07-27 PROCEDURE — 75810000275 HC EMERGENCY DEPT VISIT NO LEVEL OF CARE

## 2018-07-27 PROCEDURE — 87086 URINE CULTURE/COLONY COUNT: CPT | Performed by: EMERGENCY MEDICINE

## 2018-07-28 ENCOUNTER — HOSPITAL ENCOUNTER (EMERGENCY)
Age: 33
Discharge: HOME | End: 2018-07-28
Attending: EMERGENCY MEDICINE
Payer: MEDICAID

## 2018-07-28 VITALS
TEMPERATURE: 98.7 F | RESPIRATION RATE: 16 BRPM | DIASTOLIC BLOOD PRESSURE: 110 MMHG | HEIGHT: 68 IN | SYSTOLIC BLOOD PRESSURE: 160 MMHG | HEART RATE: 80 BPM | OXYGEN SATURATION: 100 % | WEIGHT: 183.2 LBS | BODY MASS INDEX: 27.77 KG/M2

## 2018-07-28 DIAGNOSIS — E34.9 ELEVATED SERUM HCG: Primary | ICD-10-CM

## 2018-07-28 LAB — HCG SERPL-ACNC: 365 MIU/ML (ref 0–6)

## 2018-07-28 PROCEDURE — 36415 COLL VENOUS BLD VENIPUNCTURE: CPT | Performed by: EMERGENCY MEDICINE

## 2018-07-28 PROCEDURE — 84702 CHORIONIC GONADOTROPIN TEST: CPT | Performed by: EMERGENCY MEDICINE

## 2018-07-28 PROCEDURE — 99282 EMERGENCY DEPT VISIT SF MDM: CPT

## 2018-07-28 RX ORDER — HYDROCHLOROTHIAZIDE 25 MG/1
25 TABLET ORAL DAILY
COMMUNITY
End: 2018-08-17

## 2018-07-28 NOTE — DISCHARGE INSTRUCTIONS
[facility] Departments     For adult and child immunizations, family planning, TB screening, STD testing and women's health services. [facility]: [place] [phone]      [facility] 25   [address]   [address]   170 [facility]: Adonis Brown [address] [phone]      [address]          [address] [place] [address]     For primary care services, woman and child wellness, and some clinics providing specialty care. [facility] -- 1011 [facility]. [facility]5 [facility] [phone] 411 [facility] 200 [facility] 3614 [facility] [phone]   339 [facility] [address] [place] [address] [phone]   [address]  [email or web address] [address] [place] [address] 5850  Community  [phone]   [address] [phone]   [facility] 81 [facility] [phone]   [facility] [place] 1051 [facility] [phone]   [facility]: [facility] 700 [address], ext [address] [place] [address], [address] [phone]     [address] [phone]   [facility] 5850 [facility]  [phone]   [employer]  [address] ([email or web address]) [phone]         Sexual Health/Woman Wellness Clinics    For STD/HIV testing and treatment, pregnancy testing and services, men's health, birth control services, LGBT services, and hepatitis/HPV vaccine services. [facility] [employer] [place] [employer] 201 [address]. [facility] [address] [facility] [address] [facility] 1579 600 ESTELLA Guerrero [phone]   [facility] [address], 5th floor [phone]   Pregnancy [address] [facility] [address] NATI Merr[address] [phone]         Specialty Service 1701 [facility]   [phone]   [place]andon   [phone]   Women, Infant and Children's Services: [address] [phone]       600 [facility]   [phone]   [address]   [facility]     [phone]   [address] Crisis   [address] [phone]     Local Primary Care Physicians  [facility] [phone]  MD Yon Balderas MD Gonzella Francois, MD [facility] Community Doctors [phone]  Jessica Noriega, P  Judith Coon, MD Kian Clemons MD [address] [place] [address] [phone]  MD Renny Nathan MD [address] [facility] [phone]  MD Buddy Logan MD Atlas Calin, MD Coreen Hoots, MD   [facility] [phone]  MD Gregory TOLENTINO MD Tyrone Orf, NP [address] [phone]  Enedina Perry, MD Tye Ravi, MD Pablo Silos, MD Neal Forster, MD Norva Creighton, MD Earney Drain, MD Shara Bamberger, MD   33 57 [facility]  Patito Hunt MD [address] [facility] [address] [phone]  Jenney Louder, MD Denece Alpers, MD Ragini Chavez MD Rulon Ferdinand, MD Arlander Peto, MD Burnett Bali, MD   8134 [facility] [phone]  MD Kael Hyman, P  Maria Fernanda Castañeda, NP Hallie Romney, MD Roseann Bake, MD Haven Rear, MD Claudetta Nest, MD EPHRAIM [facility] [phone]  MD Martha Rojas MD Basil Ditto, MD Marilou Song, MD Arie Eng, MD   [address] [phone]  MD Beverly Youssef MD [place] [phone]  MD Lance [place], MD Steffani Laud Sahara Garner, [address] [facility] [phone]  Nikki Antonio, MD Gregor Wesley, MD Beryle Dire, MD Mona Christopher, MD Bala Agarwal, MD Juan David Hammond, RALPH Murray MD [address]  66   [phone]  MD Alma Arciniega, MD Alva Blankenship MD   2102 [facility] [phone]  MD Crystal Kapoor, FNP  Charlotte Amin, RITA Amin, FNP  Supa Lazar, RITA Duval, MD Murtaza Alston, RALPH Shankar DO Miscellaneous:  Mikaela Escamilla [phone]

## 2018-07-28 NOTE — ED PROVIDER NOTES
EMERGENCY DEPARTMENT HISTORY AND PHYSICAL EXAM 
 
 
Date: 7/28/2018 Patient Name: Lindy Mello History of Presenting Illness Chief Complaint Patient presents with  Pregnancy Test  
  patient states she has taken 3 pregnancy test, 1 positive and 2 negative, here for confirmation, states she took a pregnancy test in the ED yesterday, did not get the results because she left after triage because of the long wait. History Provided By: Patient HPI: Lindy Mello, 28 y.o. female  presents ambulatory to the ED with cc of concern for pregnancy. She tells me her last period was in mid June 2018. She says she has taken 3 home pregnancy tests, 1 of which was positive and 2 of which were negative (she came yesterday but left before completing care; her urine pregnancy yesterday was negative). She denies vaginal bleeding or pelvic pain or vaginal discharge. She specifically denies any fevers, chills, nausea, vomiting, chest pain, shortness of breath, headache, rash, diarrhea, sweating or weight loss. Chief Complaint: positive home pregnancy test 
Duration: 1 Months Timing:  N/A Location: N/A Quality: no pain / no symptoms Severity: N/A Modifying Factors: 1 positive test at home; 2 negative tests at home Associated Symptoms: denies any other associated signs or symptoms There are no other complaints, changes, or physical findings at this time. PCP: PROVIDER UNKNOWN Current Outpatient Prescriptions Medication Sig Dispense Refill  hydroCHLOROthiazide (HYDRODIURIL) 25 mg tablet Take 25 mg by mouth daily.  amLODIPine (NORVASC) 10 mg tablet Take  by mouth daily.  labetalol (NORMODYNE) 200 mg tablet Take 200 mg by mouth two (2) times a day. Indications: hypertension Past History Past Medical History: 
Past Medical History:  
Diagnosis Date  Abnormal Papanicolaou smear of cervix  Anemia NEC   
 with pregnancy. Taking IRON  
 Chlamydia  Essential hypertension  Hypertension  Infertility   
 cerclage, prev sab  
 Ovarian cyst   
 
 
Past Surgical History: 
Past Surgical History:  
Procedure Laterality Date  HX GYN Circlage  HX LAP CHOLECYSTECTOMY  2008 Family History: 
Family History Problem Relation Age of Onset  Stroke Mother  Diabetes Father  Hypertension Father  Diabetes Sister Social History: 
Social History Substance Use Topics  Smoking status: Former Smoker  Smokeless tobacco: Never Used Comment: one or two cigarettes a month  Alcohol use Yes Comment: occasional  
 
 
Allergies: 
No Known Allergies Review of Systems Review of Systems Constitutional: Negative for fatigue and fever. HENT: Negative for ear pain and sore throat. Eyes: Negative for pain, redness and visual disturbance. Respiratory: Negative for cough and shortness of breath. Cardiovascular: Negative for chest pain and palpitations. Gastrointestinal: Negative for abdominal pain, nausea and vomiting. Genitourinary: Negative for dysuria, frequency and urgency. Positive home pregnancy test  
Musculoskeletal: Negative for back pain, gait problem, neck pain and neck stiffness. Skin: Negative for rash and wound. Neurological: Negative for dizziness, weakness, light-headedness, numbness and headaches. Physical Exam  
Physical Exam  
Constitutional: She is oriented to person, place, and time. She appears well-developed and well-nourished. Non-toxic appearance. No distress. HENT:  
Head: Normocephalic and atraumatic. Right Ear: External ear normal.  
Left Ear: External ear normal.  
Nose: Nose normal.  
Mouth/Throat: Uvula is midline. No trismus in the jaw. Eyes: Conjunctivae and EOM are normal. Pupils are equal, round, and reactive to light. No scleral icterus. Neck: Normal range of motion and full passive range of motion without pain.   
Cardiovascular: Normal rate and regular rhythm. Pulmonary/Chest: Effort normal. No accessory muscle usage. No tachypnea. No respiratory distress. She has no decreased breath sounds. She has no wheezes. Abdominal: Soft. There is no tenderness. Musculoskeletal: Normal range of motion. Neurological: She is alert and oriented to person, place, and time. She is not disoriented. No cranial nerve deficit. GCS eye subscore is 4. GCS verbal subscore is 5. GCS motor subscore is 6. Skin: Skin is intact. No rash noted. Psychiatric: She has a normal mood and affect. Her speech is normal.  
Nursing note and vitals reviewed. Diagnostic Study Results Labs - Recent Results (from the past 12 hour(s)) BETA HCG, QT Collection Time: 07/28/18  5:02 PM  
Result Value Ref Range Beta HCG,  (H) 0 - 6 MIU/ML Radiologic Studies - No orders to display CT Results  (Last 48 hours) None CXR Results  (Last 48 hours) None Medical Decision Making I am the first provider for this patient. I reviewed the vital signs, available nursing notes, past medical history, past surgical history, family history and social history. Vital Signs-Reviewed the patient's vital signs. Patient Vitals for the past 12 hrs: 
 Temp Pulse Resp BP SpO2  
07/28/18 1718 - - - (!) 160/110 -  
07/28/18 1503 98.7 °F (37.1 °C) 80 16 (!) 188/129 100 % Records Reviewed: Nursing Notes, Old Medical Records and Previous Laboratory Studies Provider Notes (Medical Decision Making): Yesterday's urine pregnancy test here was negative; given patient's concern will perform serum quant. ED Course:  
Initial assessment performed. The patients presenting problems have been discussed, and they are in agreement with the care plan formulated and outlined with them. I have encouraged them to ask questions as they arise throughout their visit. Disposition: 
Discharge PLAN: 
1.   
Discharge Medication List as of 7/28/2018  6:04 PM  
 2.  
Follow-up Information Follow up With Details Comments Contact Info Brian Galo MD Schedule an appointment as soon as possible for a visit OB/GYN: call Monday to schedule follow up. Follow up in 2 days to repeat hCG test [address] [facility] 
[phone] Return to ED if worse Diagnosis Clinical Impression: 1.  Elevated serum hCG

## 2018-07-29 LAB
BACTERIA SPEC CULT: NORMAL
CC UR VC: NORMAL
SERVICE CMNT-IMP: NORMAL

## 2018-08-13 ENCOUNTER — APPOINTMENT (OUTPATIENT)
Dept: ULTRASOUND IMAGING | Age: 33
End: 2018-08-13
Attending: EMERGENCY MEDICINE
Payer: MEDICAID

## 2018-08-13 ENCOUNTER — HOSPITAL ENCOUNTER (EMERGENCY)
Age: 33
Discharge: HOME | End: 2018-08-13
Attending: EMERGENCY MEDICINE
Payer: MEDICAID

## 2018-08-13 VITALS
OXYGEN SATURATION: 100 % | SYSTOLIC BLOOD PRESSURE: 176 MMHG | TEMPERATURE: 98.7 F | HEART RATE: 94 BPM | BODY MASS INDEX: 29.1 KG/M2 | DIASTOLIC BLOOD PRESSURE: 119 MMHG | RESPIRATION RATE: 12 BRPM | WEIGHT: 185.41 LBS | HEIGHT: 67 IN

## 2018-08-13 DIAGNOSIS — O20.9 VAGINAL BLEEDING IN PREGNANCY, FIRST TRIMESTER: ICD-10-CM

## 2018-08-13 DIAGNOSIS — O20.0 THREATENED MISCARRIAGE: Primary | ICD-10-CM

## 2018-08-13 LAB
APPEARANCE UR: ABNORMAL
BACTERIA URNS QL MICRO: NEGATIVE /HPF
BILIRUB UR QL: NEGATIVE
COLOR UR: ABNORMAL
EPITH CASTS URNS QL MICRO: ABNORMAL /LPF
GLUCOSE UR STRIP.AUTO-MCNC: NEGATIVE MG/DL
HCG SERPL-ACNC: 2577 MIU/ML (ref 0–6)
HGB UR QL STRIP: ABNORMAL
HYALINE CASTS URNS QL MICRO: ABNORMAL /LPF (ref 0–5)
KETONES UR QL STRIP.AUTO: NEGATIVE MG/DL
LEUKOCYTE ESTERASE UR QL STRIP.AUTO: ABNORMAL
NITRITE UR QL STRIP.AUTO: NEGATIVE
PH UR STRIP: 7 [PH] (ref 5–8)
PROT UR STRIP-MCNC: ABNORMAL MG/DL
RBC #/AREA URNS HPF: >100 /HPF (ref 0–5)
SP GR UR REFRACTOMETRY: 1.01 (ref 1–1.03)
UA: UC IF INDICATED,UAUC: ABNORMAL
UROBILINOGEN UR QL STRIP.AUTO: 1 EU/DL (ref 0.2–1)
WBC URNS QL MICRO: ABNORMAL /HPF (ref 0–4)

## 2018-08-13 PROCEDURE — 99283 EMERGENCY DEPT VISIT LOW MDM: CPT

## 2018-08-13 PROCEDURE — 87086 URINE CULTURE/COLONY COUNT: CPT | Performed by: EMERGENCY MEDICINE

## 2018-08-13 PROCEDURE — 84702 CHORIONIC GONADOTROPIN TEST: CPT | Performed by: EMERGENCY MEDICINE

## 2018-08-13 PROCEDURE — 36415 COLL VENOUS BLD VENIPUNCTURE: CPT | Performed by: EMERGENCY MEDICINE

## 2018-08-13 PROCEDURE — 81001 URINALYSIS AUTO W/SCOPE: CPT | Performed by: EMERGENCY MEDICINE

## 2018-08-13 PROCEDURE — 76817 TRANSVAGINAL US OBSTETRIC: CPT

## 2018-08-13 PROCEDURE — 76801 OB US < 14 WKS SINGLE FETUS: CPT

## 2018-08-13 NOTE — DISCHARGE INSTRUCTIONS
Threatened Miscarriage: Care Instructions  Your Care Instructions    Some women have light spotting or bleeding during the first 12 weeks of pregnancy. In some cases this is normal. Light spotting or bleeding can also be a sign of a possible loss of the pregnancy. This is called a threatened miscarriage. At this point, the doctor may not be able to tell if your vaginal bleeding is normal or is a sign of a miscarriage. In early pregnancy, things such as stress, exercise, and sex do not cause miscarriage. You may be worried or upset about the possibility of losing your pregnancy. But do not blame yourself. There is no treatment to stop a threatened miscarriage. If you do have a miscarriage, there was nothing you could have done to prevent it. A miscarriage usually means that the pregnancy is not developing normally. The doctor has checked you carefully, but problems can develop later. If you notice any problems or new symptoms, get medical treatment right away. Follow-up care is a key part of your treatment and safety. Be sure to make and go to all appointments, and call your doctor if you are having problems. It's also a good idea to know your test results and keep a list of the medicines you take. How can you care for yourself at home? · If you do have a miscarriage, you will probably have some vaginal bleeding for 1 to 2 weeks. Use pads instead of tampons. · Take acetaminophen (Tylenol) for cramps. Read and follow all instructions on the label. · Do not take two or more pain medicines at the same time unless the doctor told you to. Many pain medicines have acetaminophen, which is Tylenol. Too much acetaminophen (Tylenol) can be harmful. · Do not have sex until your doctor says it is okay. · Get lots of rest over the next several days. · You may do your normal activities if you feel well enough to do them. But do not do any heavy exercise until your doctor says it is okay.   · Eat a balanced diet that is high in iron and vitamin C. Foods rich in iron include red meat, shellfish, eggs, beans, and leafy green vegetables. Foods high in vitamin C include citrus fruits, tomatoes, and broccoli. Talk to your doctor about whether you need to take iron pills or a multivitamin. · Do not drink alcohol or use tobacco or illegal drugs. · Do not smoke. If you need help quitting, talk to your doctor about stop-smoking programs and medicines. These can increase your chances of quitting for good. When should you call for help? Call 911 anytime you think you may need emergency care. For example, call if:    · You passed out (lost consciousness).    Call your doctor now or seek immediate medical care if:    · You have severe vaginal bleeding.     · You are dizzy or lightheaded, or you feel like you may faint.     · You have new or worse pain in your belly or pelvis.     · You have a fever.     · You have vaginal discharge that smells bad.    Watch closely for changes in your health, and be sure to contact your doctor if:    · You do not get better as expected. Where can you learn more? Go to http://molly-howie.info/. Enter S703 in the search box to learn more about \"Threatened Miscarriage: Care Instructions. \"  Current as of: November 21, 2017  Content Version: 11.7  © 7754-4264 [employer]. Care instructions adapted under license by [employer] (which disclaims liability or warranty for this information). If you have questions about a medical condition or this instruction, always ask your healthcare professional. [place] [address] any warranty or liability for your use of this information.

## 2018-08-13 NOTE — LETTER
[address] 
[facility] EMERGENCY DEPT 
[address] [place] [address] [id number] 
[phone] Work/School Note Date: 8/13/2018 To Whom It May concern: 
 
Alondra Sams was seen and treated today in the emergency room by the following provider(s): 
Attending Provider: Dev Oneill MD. Alondra Sams may return to work 8/16/2018.  
 
Sincerely, 
 
 
 
 
Dev Oneill MD

## 2018-08-13 NOTE — ED NOTES
Assumed care of patient. Patient is alert and oriented, does not appear to be in distress. Patient ambulatory to ED with c/o vaginal bleeding this morning; denies pain. Patient states she has an incompetent cervix and this will be her sixth pregnancy with one living child. Patient positioned for comfort with call bell within reach. Side rails up for safety. Dr. Ashleigh Juares has evaluated.

## 2018-08-13 NOTE — ED PROVIDER NOTES
EMERGENCY DEPARTMENT HISTORY AND PHYSICAL EXAM      Date: 2018  Patient Name: Constantin Ponce    History of Presenting Illness     Chief Complaint   Patient presents with    Vaginal Bleeding     Patient reports finding out she was pregnant 2-3 weeks ago and started bleeding this morning. Patient denies pain, and does not know how long she has been pregnant       History Provided By: Patient    HPI: Constantin Ponce, 28 y.o. female [id number] currently ~2-3 weeks  with PMHx significant for incompetent cervix, anemia, HTN, STI and ovarian cyst, presents ambulatory to the ED for further evaluation of new onset painless vaginal bleeding noticed this morning. Pt reports no associated sx. She expresses she was seen in the ED on  and was discharged after a negative UPT but low HCG levels with instructions to follow up with her OB-GYN. Pt discloses following up with the [facility] where she was informed she was ~2-3 weeks  but given LMP was supposed to be around 8 weeks. She states upon waking up this morning she noticed a moderate amount of BRB without clots leading her to the ED. There are no exacerbating or alleviating factors to her vaginal bleeding. She denies any fevers, chills, chest pain, SOB, abdominal pain, nausea, vomiting, diarrhea, or vaginal pain. There are no other complaints, changes, or physical findings at this time. PCP: PROVIDER UNKNOWN    Current Outpatient Prescriptions   Medication Sig Dispense Refill    hydroCHLOROthiazide (HYDRODIURIL) 25 mg tablet Take 25 mg by mouth daily.  amLODIPine (NORVASC) 10 mg tablet Take  by mouth daily.  labetalol (NORMODYNE) 200 mg tablet Take 200 mg by mouth two (2) times a day. Indications: hypertension         Past History     Past Medical History:  Past Medical History:   Diagnosis Date    Abnormal Papanicolaou smear of cervix     Anemia NEC     with pregnancy.  Taking IRON    Chlamydia     Essential hypertension  Hypertension     Infertility     cerclage, prev sab    Ovarian cyst        Past Surgical History:  Past Surgical History:   Procedure Laterality Date    HX GYN      Circlage    HX LAP CHOLECYSTECTOMY  2008       Family History:  Family History   Problem Relation Age of Onset    Stroke Mother     Diabetes Father     Hypertension Father     Diabetes Sister        Social History:  Social History   Substance Use Topics    Smoking status: Former Smoker    Smokeless tobacco: Never Used      Comment: one or two cigarettes a month    Alcohol use Yes      Comment: occasional       Allergies:  No Known Allergies      Review of Systems   Review of Systems   Constitutional: Negative for chills and fever. HENT: Negative for congestion, ear pain, rhinorrhea, sore throat and trouble swallowing. Eyes: Negative for visual disturbance. Respiratory: Negative for cough, chest tightness and shortness of breath. Cardiovascular: Negative for chest pain and palpitations. Gastrointestinal: Negative for abdominal pain, blood in stool, constipation, diarrhea, nausea and vomiting. Genitourinary: Positive for vaginal bleeding. Negative for decreased urine volume, difficulty urinating, dysuria, frequency and vaginal pain. Musculoskeletal: Negative for back pain and neck pain. Skin: Negative for color change and rash. Neurological: Negative for dizziness, weakness, light-headedness and headaches. Physical Exam   Physical Exam   Constitutional: She is oriented to person, place, and time. Vital signs are normal. She appears well-developed and well-nourished. She does not appear ill. No distress. HENT:   Head: Normocephalic and atraumatic. Mouth/Throat: Oropharynx is clear and moist.   Eyes: Conjunctivae are normal.   Neck: Neck supple. Cardiovascular: Normal rate and regular rhythm. Pulmonary/Chest: Effort normal and breath sounds normal. No accessory muscle usage. No respiratory distress. Abdominal: Soft. She exhibits no distension. There is no tenderness. Lymphadenopathy:     She has no cervical adenopathy. Neurological: She is alert and oriented to person, place, and time. She has normal strength. No cranial nerve deficit or sensory deficit. Skin: Skin is warm and dry. Nursing note and vitals reviewed. Diagnostic Study Results     Labs -     Recent Results (from the past 12 hour(s))   BETA HCG, QT    Collection Time: 08/13/18  7:32 AM   Result Value Ref Range    Beta HCG, QT 2577 (H) 0 - 6 MIU/ML   URINALYSIS W/ REFLEX CULTURE    Collection Time: 08/13/18  7:32 AM   Result Value Ref Range    Color YELLOW/STRAW      Appearance CLOUDY (A) CLEAR      Specific gravity 1.014 1.003 - 1.030      pH (UA) 7.0 5.0 - 8.0      Protein TRACE (A) NEG mg/dL    Glucose NEGATIVE  NEG mg/dL    Ketone NEGATIVE  NEG mg/dL    Bilirubin NEGATIVE  NEG      Blood LARGE (A) NEG      Urobilinogen 1.0 0.2 - 1.0 EU/dL    Nitrites NEGATIVE  NEG      Leukocyte Esterase TRACE (A) NEG      WBC 5-10 0 - 4 /hpf    RBC >100 (H) 0 - 5 /hpf    Epithelial cells FEW FEW /lpf    Bacteria NEGATIVE  NEG /hpf    UA:UC IF INDICATED URINE CULTURE ORDERED (A) CNI      Hyaline cast 0-2 0 - 5 /lpf       Radiologic Studies -   US PREG UTS < 14 WKS SNGL   Final Result   Initial Result:     Impression:     IMPRESSION:   Intrauterine gestational sac, too small for age estimation. No fetal pole or  yolk sac identified. Findings could be due to early phase of gestation, though  correlation with hCG levels and close follow-up recommended. Probable small  right ovarian hemorrhagic cyst.   Recommend dedicated obstetric/sonographic followup.       Narrative:     INDICATION:   vaginal bleeding. first trimester     COMPARISON:  June 21, 2017    FINDINGS:      Realtime sonographic imaging of the pelvis was performed transabdominally.    Intrauterine gestational sac: Present , 1.1 x 0.7 x 0.9 cm to small for age  estimation.  No fetal pole or yolk sac identified. Uterus:  8.3 x 5.7 x 6.7 cm. Right ovary: 3.8 x 2.8 x 3.7 cm with blood flow. Left ovary: 3.3 x 3.0 x 2.6 cm with blood flow. Transvaginal sonography was performed to further evaluate the uterus and  ovaries. Uterus:  9.5 x 6.3 x 6.1 cm. Right ovary 3.0 x 3.3 x 3.4 cm, with blood flow and a 1.4 x 1.2 x 1.2 cm complex  cyst with peripheral hypervascularity. Left ovary 3.5 x 2.6 x 2.1 cm with blood flow. The gestation is too early to permit evaluation of the placental anatomic  structure and assessment of qualitative amniotic fluid volume. Free fluid: None. US UTS TRANSVAGINAL OB   Final Result   Initial Result:     Impression:     IMPRESSION:   Intrauterine gestational sac, too small for age estimation. No fetal pole or  yolk sac identified. Findings could be due to early phase of gestation, though  correlation with hCG levels and close follow-up recommended. Probable small  right ovarian hemorrhagic cyst.   Recommend dedicated obstetric/sonographic followup.       Narrative:     INDICATION:   vaginal bleeding. first trimester     COMPARISON:  June 21, 2017    FINDINGS:      Realtime sonographic imaging of the pelvis was performed transabdominally.    Intrauterine gestational sac: Present , 1.1 x 0.7 x 0.9 cm to small for age  estimation. No fetal pole or yolk sac identified. Uterus:  8.3 x 5.7 x 6.7 cm. Right ovary: 3.8 x 2.8 x 3.7 cm with blood flow. Left ovary: 3.3 x 3.0 x 2.6 cm with blood flow. Transvaginal sonography was performed to further evaluate the uterus and  ovaries. Uterus:  9.5 x 6.3 x 6.1 cm. Right ovary 3.0 x 3.3 x 3.4 cm, with blood flow and a 1.4 x 1.2 x 1.2 cm complex  cyst with peripheral hypervascularity. Left ovary 3.5 x 2.6 x 2.1 cm with blood flow. The gestation is too early to permit evaluation of the placental anatomic  structure and assessment of qualitative amniotic fluid volume. Free fluid: None. Medical Decision Making   I am the first provider for this patient. I reviewed the vital signs, available nursing notes, past medical history, past surgical history, family history and social history. Vital Signs-Reviewed the patient's vital signs. Patient Vitals for the past 12 hrs:   Temp Pulse Resp BP SpO2   08/13/18 0711 98.7 °F (37.1 °C) 94 12 (!) 176/119 100 %       Pulse Oximetry Analysis - 100% on room air    Cardiac Monitor:   Rate: 94 bpm  Rhythm: Normal Sinus Rhythm        Records Reviewed: Nursing Notes, Old Medical Records, Previous Radiology Studies and Previous Laboratory Studies    Provider Notes (Medical Decision Making):     Vaginal bleeding in 1st trimester. Previous US and HCG levels were low, possibly early pregnancy, cannot determine. HCG today is increased, but still not at levels we would see fetus on US. There is a gestational sac, unlikely to be ectopic. She will need to continue watching the HCG levels and repeat US. Follow-up with her OB or pregnancy clinic. ED Course:   Initial assessment performed. The patients presenting problems have been discussed, and they are in agreement with the care plan formulated and outlined with them. I have encouraged them to ask questions as they arise throughout their visit. Progress Notes:    10:26 AM   The pt has been re-evaluated. She was updated on lab and US results. Pt was informed of the plan for discharge with instructions to follow up with her OB-GYN this week. Critical Care Time: 0 minutes    Disposition:  Discharge Note:  10:27 AM  The patient is ready for discharge. The patient's signs, symptoms, diagnosis, and discharge instruction have been discussed and the patient has conveyed their understanding. The patient is to follow up as recommended or return to the ER should their symptoms worsen. Plan has been discussed and the patient is in agreement. Written by Cassandra Gallegos ED Scribe, as dictated by Dee Pederson Claudene Franks, MD    PLAN:  1. Current Discharge Medication List        2. Follow-up Information     Follow up With Details Comments Ne Saenz MD Schedule an appointment as soon as possible for a visit in 2 days  [facility]  [phone]          Return to ED if worse     Diagnosis     Clinical Impression:   1. Threatened miscarriage    2. Vaginal bleeding in pregnancy, first trimester        Attestations:    Attestation: This note is prepared by Karina Gallegos, acting as Scribe for Cheryl Whitman MD.      Cheryl Whitman MD: The scribe's documentation has been prepared under my direction and personally reviewed by me in its entirety. I confirm that the note above accurately reflects all work, treatment, procedures, and medical decision making performed by me. This note will not be viewable in [address]

## 2018-08-14 LAB
BACTERIA SPEC CULT: NORMAL
CC UR VC: NORMAL
SERVICE CMNT-IMP: NORMAL

## 2018-08-17 ENCOUNTER — HOSPITAL ENCOUNTER (EMERGENCY)
Age: 33
Discharge: HOME | End: 2018-08-17
Attending: EMERGENCY MEDICINE
Payer: MEDICAID

## 2018-08-17 VITALS
HEART RATE: 84 BPM | BODY MASS INDEX: 28.83 KG/M2 | RESPIRATION RATE: 16 BRPM | WEIGHT: 184.08 LBS | SYSTOLIC BLOOD PRESSURE: 154 MMHG | DIASTOLIC BLOOD PRESSURE: 118 MMHG | OXYGEN SATURATION: 96 % | TEMPERATURE: 98.9 F

## 2018-08-17 DIAGNOSIS — I10 ESSENTIAL HYPERTENSION: Primary | ICD-10-CM

## 2018-08-17 PROCEDURE — 74011250637 HC RX REV CODE- 250/637: Performed by: EMERGENCY MEDICINE

## 2018-08-17 PROCEDURE — 99283 EMERGENCY DEPT VISIT LOW MDM: CPT

## 2018-08-17 RX ORDER — AMLODIPINE BESYLATE 5 MG/1
5 TABLET ORAL DAILY
Qty: 30 TAB | Refills: 0 | Status: SHIPPED | OUTPATIENT
Start: 2018-08-17

## 2018-08-17 RX ORDER — AMLODIPINE BESYLATE 5 MG/1
5 TABLET ORAL
Status: COMPLETED | OUTPATIENT
Start: 2018-08-17 | End: 2018-08-17

## 2018-08-17 RX ORDER — HYDROCHLOROTHIAZIDE 25 MG/1
25 TABLET ORAL DAILY
Qty: 30 TAB | Refills: 0 | Status: SHIPPED | OUTPATIENT
Start: 2018-08-17 | End: 2018-10-22 | Stop reason: ALTCHOICE

## 2018-08-17 RX ADMIN — AMLODIPINE BESYLATE 5 MG: 5 TABLET ORAL at 15:42

## 2018-08-17 NOTE — DISCHARGE INSTRUCTIONS
DASH Diet: Care Instructions  Your Care Instructions    The DASH diet is an eating plan that can help lower your blood pressure. DASH stands for Dietary Approaches to Stop Hypertension. Hypertension is high blood pressure. The DASH diet focuses on eating foods that are high in calcium, potassium, and magnesium. These nutrients can lower blood pressure. The foods that are highest in these nutrients are fruits, vegetables, low-fat dairy products, nuts, seeds, and legumes. But taking calcium, potassium, and magnesium supplements instead of eating foods that are high in those nutrients does not have the same effect. The DASH diet also includes whole grains, fish, and poultry. The DASH diet is one of several lifestyle changes your doctor may recommend to lower your high blood pressure. Your doctor may also want you to decrease the amount of sodium in your diet. Lowering sodium while following the DASH diet can lower blood pressure even further than just the DASH diet alone. Follow-up care is a key part of your treatment and safety. Be sure to make and go to all appointments, and call your doctor if you are having problems. It's also a good idea to know your test results and keep a list of the medicines you take. How can you care for yourself at home? Following the DASH diet  · Eat 4 to 5 servings of fruit each day. A serving is 1 medium-sized piece of fruit, ½ cup chopped or canned fruit, 1/4 cup dried fruit, or 4 ounces (½ cup) of fruit juice. Choose fruit more often than fruit juice. · Eat 4 to 5 servings of vegetables each day. A serving is 1 cup of lettuce or raw leafy vegetables, ½ cup of chopped or cooked vegetables, or 4 ounces (½ cup) of vegetable juice. Choose vegetables more often than vegetable juice. · Get 2 to 3 servings of low-fat and fat-free dairy each day. A serving is 8 ounces of milk, 1 cup of yogurt, or 1 ½ ounces of cheese. · Eat 6 to 8 servings of grains each day.  A serving is 1 slice of bread, 1 ounce of dry cereal, or ½ cup of cooked rice, pasta, or cooked cereal. Try to choose whole-grain products as much as possible. · Limit lean meat, poultry, and fish to 2 servings each day. A serving is 3 ounces, about the size of a deck of cards. · Eat 4 to 5 servings of nuts, seeds, and legumes (cooked dried beans, lentils, and split peas) each week. A serving is 1/3 cup of nuts, 2 tablespoons of seeds, or ½ cup of cooked beans or peas. · Limit fats and oils to 2 to 3 servings each day. A serving is 1 teaspoon of vegetable oil or 2 tablespoons of salad dressing. · Limit sweets and added sugars to 5 servings or less a week. A serving is 1 tablespoon jelly or jam, ½ cup sorbet, or 1 cup of lemonade. · Eat less than 2,300 milligrams (mg) of sodium a day. If you limit your sodium to 1,500 mg a day, you can lower your blood pressure even more. Tips for success  · Start small. Do not try to make dramatic changes to your diet all at once. You might feel that you are missing out on your favorite foods and then be more likely to not follow the plan. Make small changes, and stick with them. Once those changes become habit, add a few more changes. · Try some of the following:  ¨ Make it a goal to eat a fruit or vegetable at every meal and at snacks. This will make it easy to get the recommended amount of fruits and vegetables each day. ¨ Try yogurt topped with fruit and nuts for a snack or healthy dessert. ¨ Add lettuce, tomato, cucumber, and onion to sandwiches. ¨ Combine a ready-made pizza crust with low-fat mozzarella cheese and lots of vegetable toppings. Try using tomatoes, squash, spinach, broccoli, carrots, cauliflower, and onions. ¨ Have a variety of cut-up vegetables with a low-fat dip as an appetizer instead of chips and dip. ¨ Sprinkle sunflower seeds or chopped almonds over salads. Or try adding chopped walnuts or almonds to cooked vegetables.   ¨ Try some vegetarian meals using beans and peas. Add garbanzo or kidney beans to salads. Make burritos and tacos with mashed perdue beans or black beans. Where can you learn more? Go to http://molly-howie.info/. Enter C139 in the search box to learn more about \"DASH Diet: Care Instructions. \"  Current as of: December 6, 2017  Content Version: 11.7  © 2003-7593 [employer]. Care instructions adapted under license by [employer] (which disclaims liability or warranty for this information). If you have questions about a medical condition or this instruction, always ask your healthcare professional. [place] [address] any warranty or liability for your use of this information. Acute High Blood Pressure: Care Instructions  Your Care Instructions    Acute high blood pressure is very high blood pressure. It's a serious problem. Very high blood pressure can damage your brain, heart, eyes, and kidneys. You may have been given medicines to lower your blood pressure. You may have gotten them as pills or through a needle in one of your veins. This is called an IV. And maybe you were given other medicines too. These can be needed when high blood pressure causes other problems. To keep your blood pressure at a lower level, you may need to make healthy lifestyle changes. And you will probably need to take medicines. Be sure to follow up with your doctor about your blood pressure and what you can do about it. Follow-up care is a key part of your treatment and safety. Be sure to make and go to all appointments, and call your doctor if you are having problems. It's also a good idea to know your test results and keep a list of the medicines you take. How can you care for yourself at home? · See your doctor as often as he or she recommends. This is to make sure your blood pressure is under control. You will probably go at least 2 times a year.  But it may be more often at first.  · Take your blood pressure medicine exactly as prescribed. You may take one or more types. They include diuretics, beta-blockers, ACE inhibitors, calcium channel blockers, and angiotensin II receptor blockers. Call your doctor if you think you are having a problem with your medicine. · If you take blood pressure medicine, talk to your doctor before you take decongestants or anti-inflammatory medicine, such as ibuprofen. These can raise blood pressure. · Learn how to check your blood pressure at home. Check it often. · Ask your doctor if it's okay to drink alcohol. · Talk to your doctor about lifestyle changes that can help blood pressure. These include being active and not smoking. When should you call for help? Call 911 anytime you think you may need emergency care. This may mean having symptoms that suggest that your blood pressure is causing a serious heart or blood vessel problem. Your blood pressure may be over 180/110.   For example, call 911 if:    · You have symptoms of a heart attack. These may include:  ¨ Chest pain or pressure, or a strange feeling in the chest.  ¨ Sweating. ¨ Shortness of breath. ¨ Nausea or vomiting. ¨ Pain, pressure, or a strange feeling in the back, neck, jaw, or upper belly or in one or both shoulders or arms. ¨ Lightheadedness or sudden weakness. ¨ A fast or irregular heartbeat.     · You have symptoms of a stroke. These may include:  ¨ Sudden numbness, tingling, weakness, or loss of movement in your face, arm, or leg, especially on only one side of your body. ¨ Sudden vision changes. ¨ Sudden trouble speaking. ¨ Sudden confusion or trouble understanding simple statements. ¨ Sudden problems with walking or balance. ¨ A sudden, severe headache that is different from past headaches.     · You have severe back or belly pain.    Do not wait until your blood pressure comes down on its own.  Get help right away.   Call your doctor now or seek immediate care if:    · Your blood pressure is much higher than normal (such as 180/110 or higher), but you don't have symptoms.     · You think high blood pressure is causing symptoms, such as:  ¨ Severe headache. ¨ Blurry vision.    Watch closely for changes in your health, and be sure to contact your doctor if:    · Your blood pressure measures 140/90 or higher at least 2 times. That means the top number is 140 or higher or the bottom number is 90 or higher, or both.     · You think you may be having side effects from your blood pressure medicine.     · Your blood pressure is usually normal, but it goes above normal at least 2 times. Where can you learn more? Go to http://molly-howie.info/. Enter K259 in the search box to learn more about \"Acute High Blood Pressure: Care Instructions. \"  Current as of: May 10, 2017  Content Version: 11.7  © 8251-8331 [employer]. Care instructions adapted under license by [employer] (which disclaims liability or warranty for this information). If you have questions about a medical condition or this instruction, always ask your healthcare professional. [address] any warranty or liability for your use of this information. Seakeeper Activation    Thank you for requesting access to Seakeeper. Please follow the instructions below to securely access and download your online medical record. Seakeeper allows you to send messages to your doctor, view your test results, renew your prescriptions, schedule appointments, and more. How Do I Sign Up? 1. In your internet browser, go to [email or web address]  2. Click on the First Time User? Click Here link in the Sign In box. You will be redirect to the New Member Sign Up page. 3. Enter your Seakeeper Access Code exactly as it appears below. You will not need to use this code after youve completed the sign-up process. If you do not sign up before the expiration date, you must request a new code.     MyChart Access Code: Activation code not generated  Current Easydiagnosis Status: Active (This is the date your Easydiagnosis access code will )    4. Enter the last four digits of your Social Security Number (xxxx) and Date of Birth (mm/dd/yyyy) as indicated and click Submit. You will be taken to the next sign-up page. 5. Create a [email or web address] ID. This will be your Easydiagnosis login ID and cannot be changed, so think of one that is secure and easy to remember. 6. Create a Easydiagnosis password. You can change your password at any time. 7. Enter your Password Reset Question and Answer. This can be used at a later time if you forget your password. 8. Enter your e-mail address. You will receive e-mail notification when new information is available in [address] 9. Click Sign Up. You can now view and download portions of your medical record. 10. Click the Download Summary menu link to download a portable copy of your medical information. Additional Information    If you have questions, please visit the Frequently Asked Questions section of the Easydiagnosis website at [email or web address] Remember, Easydiagnosis is NOT to be used for urgent needs. For medical emergencies, dial 911.

## 2018-08-17 NOTE — ED NOTES
Breanne Savage MD reviewed discharge instructions with the patient. The patient verbalized understanding. Ambulatory on discharge.

## 2018-08-17 NOTE — ED PROVIDER NOTES
EMERGENCY DEPARTMENT HISTORY AND PHYSICAL EXAM      Date: 2018  Patient Name: Ryder Flynn    History of Presenting Illness     Chief Complaint   Patient presents with    Hypertension     pt reports she was sent by OB/GYN for high blood pressure, was seen for positive pregnancy today       History Provided By: Patient    HPI: Ryder Flynn, 28 y.o. female with PMHx significant for HTN, Anemia, Chlamydia, presents ambulatory to the ED with cc of high blood pressure with positive pregnancy test today sent from [facility] office. Pt is being followed by Dr. Alyse Perez. Was seen by Dr. Alyse Perez today. Had labs and an US done with positive pregnancy test. She was sent to the ED for control of her HTN and for referral to PCP. Pt does have a known hx of HTN, she has Lobatalol, Lisinopril, and HCTZ at home, today only took a dose of HCTZ. Pt does plan on doing a D&C . Her main concern today is her BP and to get her connected to a PCP. Pt denies any other symptoms including vaginal bleeding, vaginal discharge, abdominal pain, nausea, vomiting, diarrhea, CP, SOB. Social Hx: - Tobacco (former smoker), + EtOH (occasionally), - illicit drug use (-)    There are no other complaints, changes, or physical findings at this time. PCP: PROVIDER UNKNOWN    Current Outpatient Prescriptions   Medication Sig Dispense Refill    hydroCHLOROthiazide (HYDRODIURIL) 25 mg tablet Take 1 Tab by mouth daily. 30 Tab 0    amLODIPine (NORVASC) 5 mg tablet Take 1 Tab by mouth daily. 30 Tab 0    labetalol (NORMODYNE) 200 mg tablet Take 200 mg by mouth two (2) times a day. Indications: hypertension         Past History     Past Medical History:  Past Medical History:   Diagnosis Date    Abnormal Papanicolaou smear of cervix     Anemia NEC     with pregnancy.  Taking IRON    Chlamydia     Essential hypertension     Hypertension     Infertility     cerclage, prev sab    Ovarian cyst        Past Surgical History:  Past Surgical History:   Procedure Laterality Date    HX GYN      Circlage    HX LAP CHOLECYSTECTOMY  2008       Family History:  Family History   Problem Relation Age of Onset    Stroke Mother     Diabetes Father     Hypertension Father     Diabetes Sister        Social History:  Social History   Substance Use Topics    Smoking status: Former Smoker    Smokeless tobacco: Never Used      Comment: one or two cigarettes a month    Alcohol use Yes      Comment: occasional       Allergies:  No Known Allergies      Review of Systems   Review of Systems   Constitutional: Negative. Negative for chills and fever. HENT: Negative. Negative for congestion and rhinorrhea. Respiratory: Negative. Negative for cough, chest tightness and wheezing. Cardiovascular: Negative for chest pain and palpitations.        + HTN   Gastrointestinal: Negative. Negative for abdominal pain, constipation, nausea and vomiting. Endocrine: Negative. Genitourinary: Negative for decreased urine volume, flank pain, hematuria and pelvic pain. + pregnancy   Musculoskeletal: Negative. Negative for back pain and neck pain. Skin: Negative. Negative for color change, pallor and rash. Neurological: Negative. Negative for dizziness, seizures, weakness, numbness and headaches. Hematological: Negative. Negative for adenopathy. Psychiatric/Behavioral: Negative. All other systems reviewed and are negative. Physical Exam   Physical Exam   Constitutional: She is oriented to person, place, and time. She appears well-developed and well-nourished. No distress. HENT:   Head: Normocephalic and atraumatic. Mouth/Throat: No oropharyngeal exudate. Eyes: Conjunctivae are normal. Pupils are equal, round, and reactive to light. Right eye exhibits no discharge. Left eye exhibits no discharge. No scleral icterus. Neck: Normal range of motion. Neck supple. No JVD present.    Cardiovascular: Normal rate, regular rhythm, normal heart sounds and intact distal pulses. Exam reveals no gallop and no friction rub. No murmur heard. Pulmonary/Chest: Effort normal and breath sounds normal. No stridor. No respiratory distress. She has no wheezes. She has no rales. She exhibits no tenderness. Abdominal: Soft. Bowel sounds are normal. She exhibits no distension and no mass. There is no tenderness. There is no rebound and no guarding. Neurological: She is alert and oriented to person, place, and time. She displays normal reflexes. No cranial nerve deficit. She exhibits normal muscle tone. Coordination normal.   Skin: Skin is warm. No rash noted. She is not diaphoretic. No pallor. Vitals reviewed. Medical Decision Making   I am the first provider for this patient. I reviewed the vital signs, available nursing notes, past medical history, past surgical history, family history and social history. Vital Signs-Reviewed the patient's vital signs. Patient Vitals for the past 12 hrs:   Temp Pulse Resp BP SpO2   08/17/18 1521 98.9 °F (37.2 °C) 84 16 (!) 154/118 96 %     Records Reviewed: Nursing Notes, Old Medical Records, Previous Radiology Studies and Previous Laboratory Studies    Provider Notes (Medical Decision Making):   DDx: Essential HTN, Hypertensive urgency, pregnancy related complication, medication non compliance. Impression/plan: Hx of Chronic HTN not currently followed by PCP to the ER per request of her OB. Has had recent diagnosis of probable failed pregnancy with planned D&C next week. She has no other focal complaints. Recent renal function normal. Has been on Amlodipine in the past. Will continue HCTZ and Amlodipine with referral to PCP. ED Course:   Initial assessment performed. The patients presenting problems have been discussed, and they are in agreement with the care plan formulated and outlined with them. I have encouraged them to ask questions as they arise throughout their visit.     CONSULT NOTE:   3:34 PM  Kinga Pollard MD spoke with Dr. Gayle Headley,   Specialty: OB/GYN  Discussed pt's hx, disposition, and available diagnostic and imaging results. Reviewed care plans. Consultant agrees with plans as outlined. He is planning a D&C on Monday, he recommends treating her as a normal Hypertensive pt. .   Written by Wolfgang Garcia, ED Scribe, as dictated by Kinga Pollard MD.    PROGRESS NOTE:  3:41 PM  Pt has no end organ signs or complaints related to BP. Recent renal function was normal. Pt agrees with starting Amlodipine and close follow up with PCP for further management. Critical Care Time:   None. Disposition:  DISCHARGE NOTE  3:41 PM  The patient has been re-evaluated and is ready for discharge. Reviewed available results with patient. Counseled pt on diagnosis and care plan. Pt has expressed understanding, and all questions have been answered. Pt agrees with plan and agrees to F/U as recommended, or return to the ED if their sxs worsen. Discharge instructions have been provided and explained to the pt, along with reasons to return to the ED. PLAN:  1. Current Discharge Medication List      CONTINUE these medications which have CHANGED    Details   hydroCHLOROthiazide (HYDRODIURIL) 25 mg tablet Take 1 Tab by mouth daily. Qty: 30 Tab, Refills: 0      amLODIPine (NORVASC) 5 mg tablet Take 1 Tab by mouth daily. Qty: 30 Tab, Refills: 0           2.    Follow-up Information     Follow up With Details Comments [address], DO Schedule an appointment as soon as possible for a visit in 3 days  [address] [facility]  [address] [facility]      Sultana Senior MD Schedule an appointment as soon as possible for a visit in 3 days  [facility]  [phone]      [facility] EMERGENCY DEPT  If symptoms worsen [address]  [address] [facility]  [phone]        Return to ED if worse Diagnosis     Clinical Impression:   1. Essential hypertension        Attestations: This note is prepared by Fritz Bernard acting as Scribe for MD Abigail Rocha MD : The scribe's documentation has been prepared under my direction and personally reviewed by me in its entirety. I confirm that the note above accurately reflects all work, treatment, procedures, and medical decision making performed by me.

## 2018-08-25 ENCOUNTER — HOSPITAL ENCOUNTER (OUTPATIENT)
Age: 33
Setting detail: OBSERVATION
Discharge: HOME | End: 2018-08-26
Attending: EMERGENCY MEDICINE | Admitting: OBSTETRICS & GYNECOLOGY
Payer: MEDICAID

## 2018-08-25 DIAGNOSIS — O03.1 INCOMPLETE ABORTION WITH DELAYED OR EXCESSIVE HEMORRHAGE: ICD-10-CM

## 2018-08-25 DIAGNOSIS — O03.30 INCOMPLETE ABORTION WITH COMPLICATION: Primary | ICD-10-CM

## 2018-08-25 LAB
ABO + RH BLD: NORMAL
ALBUMIN SERPL-MCNC: 3.7 G/DL (ref 3.5–5)
ALBUMIN/GLOB SERPL: 1.2 {RATIO} (ref 1.1–2.2)
ALP SERPL-CCNC: 51 U/L (ref 45–117)
ALT SERPL-CCNC: 19 U/L (ref 12–78)
ANION GAP SERPL CALC-SCNC: 6 MMOL/L (ref 5–15)
AST SERPL-CCNC: 13 U/L (ref 15–37)
BASOPHILS # BLD: 0 K/UL (ref 0–0.1)
BASOPHILS NFR BLD: 1 % (ref 0–1)
BILIRUB SERPL-MCNC: 0.2 MG/DL (ref 0.2–1)
BLOOD BANK CMNT PATIENT-IMP: NORMAL
BUN SERPL-MCNC: 17 MG/DL (ref 6–20)
BUN/CREAT SERPL: 19 (ref 12–20)
CALCIUM SERPL-MCNC: 8.5 MG/DL (ref 8.5–10.1)
CHLORIDE SERPL-SCNC: 105 MMOL/L (ref 97–108)
CO2 SERPL-SCNC: 28 MMOL/L (ref 21–32)
CREAT SERPL-MCNC: 0.88 MG/DL (ref 0.55–1.02)
DIFFERENTIAL METHOD BLD: ABNORMAL
EOSINOPHIL # BLD: 0.2 K/UL (ref 0–0.4)
EOSINOPHIL NFR BLD: 2 % (ref 0–7)
ERYTHROCYTE [DISTWIDTH] IN BLOOD BY AUTOMATED COUNT: 18.7 % (ref 11.5–14.5)
GLOBULIN SER CALC-MCNC: 3.2 G/DL (ref 2–4)
GLUCOSE SERPL-MCNC: 89 MG/DL (ref 65–100)
HCG SERPL-ACNC: 2930 MIU/ML (ref 0–6)
HCT VFR BLD AUTO: 35.9 % (ref 35–47)
HGB BLD-MCNC: 11.5 G/DL (ref 11.5–16)
IMM GRANULOCYTES # BLD: 0 K/UL (ref 0–0.04)
IMM GRANULOCYTES NFR BLD AUTO: 0 % (ref 0–0.5)
LYMPHOCYTES # BLD: 2.9 K/UL (ref 0.8–3.5)
LYMPHOCYTES NFR BLD: 33 % (ref 12–49)
MCH RBC QN AUTO: 25.4 PG (ref 26–34)
MCHC RBC AUTO-ENTMCNC: 32 G/DL (ref 30–36.5)
MCV RBC AUTO: 79.4 FL (ref 80–99)
MONOCYTES # BLD: 0.7 K/UL (ref 0–1)
MONOCYTES NFR BLD: 7 % (ref 5–13)
NEUTS SEG # BLD: 5 K/UL (ref 1.8–8)
NEUTS SEG NFR BLD: 57 % (ref 32–75)
NRBC # BLD: 0 K/UL (ref 0–0.01)
NRBC BLD-RTO: 0 PER 100 WBC
PLATELET # BLD AUTO: 253 K/UL (ref 150–400)
PMV BLD AUTO: 10.9 FL (ref 8.9–12.9)
POTASSIUM SERPL-SCNC: 3 MMOL/L (ref 3.5–5.1)
PROT SERPL-MCNC: 6.9 G/DL (ref 6.4–8.2)
RBC # BLD AUTO: 4.52 M/UL (ref 3.8–5.2)
SODIUM SERPL-SCNC: 139 MMOL/L (ref 136–145)
WBC # BLD AUTO: 8.8 K/UL (ref 3.6–11)

## 2018-08-25 PROCEDURE — 80053 COMPREHEN METABOLIC PANEL: CPT | Performed by: EMERGENCY MEDICINE

## 2018-08-25 PROCEDURE — 84702 CHORIONIC GONADOTROPIN TEST: CPT | Performed by: EMERGENCY MEDICINE

## 2018-08-25 PROCEDURE — 99285 EMERGENCY DEPT VISIT HI MDM: CPT

## 2018-08-25 PROCEDURE — 86900 BLOOD TYPING SEROLOGIC ABO: CPT | Performed by: EMERGENCY MEDICINE

## 2018-08-25 PROCEDURE — 85025 COMPLETE CBC W/AUTO DIFF WBC: CPT | Performed by: EMERGENCY MEDICINE

## 2018-08-25 PROCEDURE — 36415 COLL VENOUS BLD VENIPUNCTURE: CPT | Performed by: EMERGENCY MEDICINE

## 2018-08-25 NOTE — IP AVS SNAPSHOT
[address] [facility] 
[phone] Patient: Mary Pastor MRN: [id number] NELLI:[id number] A check alondra indicates which time of day the medication should be taken. My Medications START taking these medications Instructions Each Dose to Equal  
 Morning Noon Evening Bedtime  
 ferrous sulfate 325 mg (65 mg iron) tablet Commonly known as:  Iron Your last dose was: Your next dose is: Take 1 Tab by mouth Daily (before breakfast). 325 mg HYDROcodone-acetaminophen 5-325 mg per tablet Commonly known as:  Cheri Cruz Your last dose was: Your next dose is: Take 1 Tab by mouth every four (4) hours as needed. Max Daily Amount: 6 Tabs. 1 Tab  
    
   
   
   
  
 ibuprofen 600 mg tablet Commonly known as:  MOTRIN Your last dose was: Your next dose is: Take 1 Tab by mouth every six (6) hours as needed for Pain. 600 mg CHANGE how you take these medications Instructions Each Dose to Equal  
 Morning Noon Evening Bedtime  
 amLODIPine 5 mg tablet Commonly known as:  Yamilet Macias What changed:  when to take this Your last dose was: Your next dose is: Take 1 Tab by mouth daily. 5 mg  
    
   
   
   
  
 hydroCHLOROthiazide 25 mg tablet Commonly known as:  HYDRODIURIL What changed:  when to take this Your last dose was: Your next dose is: Take 1 Tab by mouth daily. 25 mg Where to Get Your Medications Information on where to get these meds will be given to you by the nurse or doctor. ! Ask your nurse or doctor about these medications  
  ferrous sulfate 325 mg (65 mg iron) tablet HYDROcodone-acetaminophen 5-325 mg per tablet  
 ibuprofen 600 mg tablet

## 2018-08-25 NOTE — IP AVS SNAPSHOT
[address] 
[phone] Patient: Amber Triana MRN: [id number] NRD: About your hospitalization You were admitted on:  2018 You last received care in the:  [facility] 2 GENERAL SURGERY You were discharged on:  2018 Why you were hospitalized Your primary diagnosis was:  Not on File Your diagnoses also included:  Incomplete  With Delayed Or Excessive Hemorrhage Follow-up Information Follow up With Details Comments Contact Info Provider Unknown   Patient not available to ask Luz Smith MD In 3 days  [address] 
[phone] Your Scheduled Appointments 2018 DILATATION AND CURETTAGE WITH SUCTION with Luz Smith MD  
[facility] SURGERY (RI OR PRE ASSESSMENT) [address] [facility] [address]  
[phone] Discharge Orders None A check alondra indicates which time of day the medication should be taken. My Medications START taking these medications Instructions Each Dose to Equal  
 Morning Noon Evening Bedtime  
 ferrous sulfate 325 mg (65 mg iron) tablet Commonly known as:  Iron Your last dose was: Your next dose is: Take 1 Tab by mouth Daily (before breakfast). 325 mg HYDROcodone-acetaminophen 5-325 mg per tablet Commonly known as:  Lobo Wootenipes Your last dose was: Your next dose is: Take 1 Tab by mouth every four (4) hours as needed. Max Daily Amount: 6 Tabs. 1 Tab  
    
   
   
   
  
 ibuprofen 600 mg tablet Commonly known as:  MOTRIN Your last dose was: Your next dose is: Take 1 Tab by mouth every six (6) hours as needed for Pain. 600 mg CHANGE how you take these medications Instructions Each Dose to Equal  
 Morning Noon Evening Bedtime  
 amLODIPine 5 mg tablet Commonly known as:  Suresh Del What changed:  when to take this Your last dose was: Your next dose is: Take 1 Tab by mouth daily. 5 mg  
    
   
   
   
  
 hydroCHLOROthiazide 25 mg tablet Commonly known as:  HYDRODIURIL What changed:  when to take this Your last dose was: Your next dose is: Take 1 Tab by mouth daily. 25 mg Where to Get Your Medications Information on where to get these meds will be given to you by the nurse or doctor. ! Ask your nurse or doctor about these medications  
  ferrous sulfate 325 mg (65 mg iron) tablet HYDROcodone-acetaminophen 5-325 mg per tablet  
 ibuprofen 600 mg tablet Opioid Education Prescription Opioids: What You Need to Know: 
 
Prescription opioids can be used to help relieve moderate-to-severe pain and are often prescribed following a surgery or injury, or for certain health conditions. These medications can be an important part of treatment but also come with serious risks. Opioids are strong pain medicines. Examples include hydrocodone, oxycodone, fentanyl, and morphine. Heroin is an example of an illegal opioid. It is important to work with your health care provider to make sure you are getting the safest, most effective care. WHAT ARE THE RISKS AND SIDE EFFECTS OF OPIOID USE? Prescription opioids carry serious risks of addiction and overdose, especially with prolonged use. An opioid overdose, often marked by slow breathing, can cause sudden death. The use of prescription opioids can have a number of side effects as well, even when taken as directed. · Tolerance-meaning you might need to take more of a medication for the same pain relief · Physical dependence-meaning you have symptoms of withdrawal when the medication is stopped. Withdrawal symptoms can include nausea, sweating, chills, diarrhea, stomach cramps, and muscle aches. Withdrawal can last up to several weeks, depending on which drug you took and how long you took it. · Increased sensitivity to pain · Constipation · Nausea, vomiting, and dry mouth · Sleepiness and dizziness · Confusion · Depression · Low levels of testosterone that can result in lower sex drive, energy, and strength · Itching and sweating RISKS ARE GREATER WITH:      
· History of drug misuse, substance use disorder, or overdose · Mental health conditions (such as depression or anxiety) · Sleep apnea · Older age (72 years or older) · Pregnancy Avoid alcohol while taking prescription opioids. Also, unless specifically advised by your health care provider, medications to avoid include: · Benzodiazepines (such as Xanax or Valium) · Muscle relaxants (such as Soma or Flexeril) · Hypnotics (such as Ambien or Lunesta) · Other prescription opioids KNOW YOUR OPTIONS Talk to your health care provider about ways to manage your pain that don't involve prescription opioids. Some of these options may actually work better and have fewer risks and side effects. Options may include: 
· Pain relievers such as acetaminophen, ibuprofen, and naproxen · Some medications that are also used for depression or seizures · Physical therapy and exercise · Counseling to help patients learn how to cope better with triggers of pain and stress. · Application of heat or cold compress · Massage therapy · Relaxation techniques Be Informed Make sure you know the name of your medication, how much and how often to take it, and its potential risks & side effects. IF YOU ARE PRESCRIBED OPIOIDS FOR PAIN: 
· Never take opioids in greater amounts or more often than prescribed. Remember the goal is not to be pain-free but to manage your pain at a tolerable level. · Follow up with your primary care provider to: · Work together to create a plan on how to manage your pain. · Talk about ways to help manage your pain that don't involve prescription opioids. · Talk about any and all concerns and side effects. · Help prevent misuse and abuse. · Never sell or share prescription opioids · Help prevent misuse and abuse. · Store prescription opioids in a secure place and out of reach of others (this may include visitors, children, friends, and family). · Safely dispose of unused/unwanted prescription opioids: Find your community drug take-back program or your pharmacy mail-back program, or flush them down the toilet, following guidance from the Food and Drug Administration (www.fda.gov/Drugs/ResourcesForYou). · Visit www.cdc.gov/drugoverdose to learn about the risks of opioid abuse and overdose. · If you believe you may be struggling with addiction, tell your health care provider and ask for guidance or call [employer] at 9-973-794-SWXC. Discharge Instructions None Introducing [facility]! Dear Tabtiha Vazquez: Thank you for requesting a PerfectSearch account. Our records indicate that you already have an active PerfectSearch account. You can access your account anytime at [email or web address] Did you know that you can access your hospital and ER discharge instructions at any time in PerfectSearch? You can also review all of your test results from your hospital stay or ER visit. Additional Information If you have questions, please visit the Frequently Asked Questions section of the PerfectSearch website at [email or web address] Remember, PerfectSearch is NOT to be used for urgent needs. For medical emergencies, dial 911. Now available from your iPhone and Android! Introducing Matias Lemus As a [employer] patient, I wanted to make you aware of our electronic visit tool called Matias Lemus. [employer] 24/7 allows you to connect within minutes with a medical provider 24 hours a day, seven days a week via a mobile device or tablet or logging into a secure website from your computer. You can access Matias Johnsonfin from anywhere in the [place]. A virtual visit might be right for you when you have a simple condition and feel like you just dont want to get out of bed, or cant get away from work for an appointment, when your regular [employer] provider is not available (evenings, weekends or holidays), or when youre out of town and need minor care. Electronic visits cost only $49 and if the [employer] 24/7 provider determines a prescription is needed to treat your condition, one can be electronically transmitted to a nearby pharmacy*. Please take a moment to enroll today if you have not already done so. The enrollment process is free and takes just a few minutes. To enroll, please download the [employer] 24/7 ruddy to your tablet or phone, or visit [email or web address] to enroll on your computer. And, as an [address] [place] [address] patient with a [employer] account, the results of your visits will be scanned into your electronic medical record and your primary care provider will be able to view the scanned results. We urge you to continue to see your regular [employer] provider for your ongoing medical care. And while your primary care provider may not be the one available when you seek a Matias Ferrermiguelfin virtual visit, the peace of mind you get from getting a real diagnosis real time can be priceless. For more information on Matias Nabilmiguelfin, view our Frequently Asked Questions (FAQs) at [email or web address]. Sincerely, 
 
Shade Park MD 
Chief Medical Officer Sonia8 Darshana Cruz *:  certain medications cannot be prescribed via Matias Lemus Providers Seen During Your Hospitalization Provider Specialty Primary office phone Kassandra Bahena MD Emergency Medicine [phone] Arthur Rodriguez MD Obstetrics & Gynecology [phone] Your Primary Care Physician (PCP) Primary Care Physician Office Phone Office Fax UNKNOWN, PROVIDER ** None ** ** None ** You are allergic to the following No active allergies Recent Documentation Height Weight BMI OB Status Smoking Status 1.702 m 84.4 kg 29.13 kg/m2 Pregnant Former Smoker Emergency Contacts Name Discharge Info Relation Home Work Mobile [place] Dress TO CHOOSE [5] Sister [23] [phone] Patient Belongings The following personal items are in your possession at time of discharge: 
  Dental Appliances: None  Visual Aid: None      Home Medications: None   Jewelry: None  Clothing: At bedside    Other Valuables: At bedside, Cell Phone Please provide this summary of care documentation to your next provider. Signatures-by signing, you are acknowledging that this After Visit Summary has been reviewed with you and you have received a copy. Patient Signature:  ____________________________________________________________ Date:  ____________________________________________________________  
  
[employer] Provider Signature:  ____________________________________________________________ Date:  ____________________________________________________________

## 2018-08-26 ENCOUNTER — ANESTHESIA EVENT (OUTPATIENT)
Dept: SURGERY | Age: 33
End: 2018-08-26
Payer: MEDICAID

## 2018-08-26 ENCOUNTER — ANESTHESIA (OUTPATIENT)
Dept: SURGERY | Age: 33
End: 2018-08-26
Payer: MEDICAID

## 2018-08-26 VITALS
SYSTOLIC BLOOD PRESSURE: 128 MMHG | RESPIRATION RATE: 16 BRPM | OXYGEN SATURATION: 100 % | HEART RATE: 60 BPM | HEIGHT: 67 IN | BODY MASS INDEX: 29.19 KG/M2 | WEIGHT: 186 LBS | DIASTOLIC BLOOD PRESSURE: 84 MMHG | TEMPERATURE: 98.2 F

## 2018-08-26 LAB
ERYTHROCYTE [DISTWIDTH] IN BLOOD BY AUTOMATED COUNT: 18.6 % (ref 11.5–14.5)
HCT VFR BLD AUTO: 27.1 % (ref 35–47)
HGB BLD-MCNC: 8.8 G/DL (ref 11.5–16)
MCH RBC QN AUTO: 25.6 PG (ref 26–34)
MCHC RBC AUTO-ENTMCNC: 32.5 G/DL (ref 30–36.5)
MCV RBC AUTO: 78.8 FL (ref 80–99)
NRBC # BLD: 0 K/UL (ref 0–0.01)
NRBC BLD-RTO: 0 PER 100 WBC
PLATELET # BLD AUTO: 224 K/UL (ref 150–400)
PMV BLD AUTO: 11.3 FL (ref 8.9–12.9)
RBC # BLD AUTO: 3.44 M/UL (ref 3.8–5.2)
WBC # BLD AUTO: 13.3 K/UL (ref 3.6–11)

## 2018-08-26 PROCEDURE — 74011250636 HC RX REV CODE- 250/636

## 2018-08-26 PROCEDURE — 88305 TISSUE EXAM BY PATHOLOGIST: CPT | Performed by: OBSTETRICS & GYNECOLOGY

## 2018-08-26 PROCEDURE — 99218 HC RM OBSERVATION: CPT

## 2018-08-26 PROCEDURE — 85027 COMPLETE CBC AUTOMATED: CPT | Performed by: OBSTETRICS & GYNECOLOGY

## 2018-08-26 PROCEDURE — 76010000138 HC OR TIME 0.5 TO 1 HR: Performed by: OBSTETRICS & GYNECOLOGY

## 2018-08-26 PROCEDURE — 77030009368: Performed by: OBSTETRICS & GYNECOLOGY

## 2018-08-26 PROCEDURE — 36415 COLL VENOUS BLD VENIPUNCTURE: CPT | Performed by: OBSTETRICS & GYNECOLOGY

## 2018-08-26 PROCEDURE — 76210000063 HC OR PH I REC FIRST 0.5 HR: Performed by: OBSTETRICS & GYNECOLOGY

## 2018-08-26 PROCEDURE — 76060000032 HC ANESTHESIA 0.5 TO 1 HR: Performed by: OBSTETRICS & GYNECOLOGY

## 2018-08-26 PROCEDURE — 77030005537 HC CATH URETH BARD -A: Performed by: OBSTETRICS & GYNECOLOGY

## 2018-08-26 PROCEDURE — 77030008578 HC TBNG UTER SUC BUSS -A: Performed by: OBSTETRICS & GYNECOLOGY

## 2018-08-26 PROCEDURE — 74011250636 HC RX REV CODE- 250/636: Performed by: OBSTETRICS & GYNECOLOGY

## 2018-08-26 PROCEDURE — 85018 HEMOGLOBIN: CPT

## 2018-08-26 RX ORDER — HYDROCODONE BITARTRATE AND ACETAMINOPHEN 5; 325 MG/1; MG/1
1 TABLET ORAL
Qty: 10 TAB | Refills: 0 | Status: SHIPPED | OUTPATIENT
Start: 2018-08-26 | End: 2018-08-26

## 2018-08-26 RX ORDER — SODIUM CHLORIDE, SODIUM LACTATE, POTASSIUM CHLORIDE, CALCIUM CHLORIDE 600; 310; 30; 20 MG/100ML; MG/100ML; MG/100ML; MG/100ML
INJECTION, SOLUTION INTRAVENOUS
Status: DISCONTINUED | OUTPATIENT
Start: 2018-08-26 | End: 2018-08-26 | Stop reason: HOSPADM

## 2018-08-26 RX ORDER — KETOROLAC TROMETHAMINE 30 MG/ML
INJECTION, SOLUTION INTRAMUSCULAR; INTRAVENOUS AS NEEDED
Status: DISCONTINUED | OUTPATIENT
Start: 2018-08-26 | End: 2018-08-26 | Stop reason: HOSPADM

## 2018-08-26 RX ORDER — SODIUM CHLORIDE 0.9 % (FLUSH) 0.9 %
5-10 SYRINGE (ML) INJECTION AS NEEDED
Status: DISCONTINUED | OUTPATIENT
Start: 2018-08-26 | End: 2018-08-26

## 2018-08-26 RX ORDER — KETOROLAC TROMETHAMINE 30 MG/ML
INJECTION, SOLUTION INTRAMUSCULAR; INTRAVENOUS
Status: COMPLETED
Start: 2018-08-26 | End: 2018-08-26

## 2018-08-26 RX ORDER — IBUPROFEN 600 MG/1
600 TABLET ORAL
Qty: 30 TAB | Refills: 1 | Status: SHIPPED | OUTPATIENT
Start: 2018-08-26 | End: 2018-08-26

## 2018-08-26 RX ORDER — LIDOCAINE HYDROCHLORIDE 20 MG/ML
INJECTION, SOLUTION EPIDURAL; INFILTRATION; INTRACAUDAL; PERINEURAL AS NEEDED
Status: DISCONTINUED | OUTPATIENT
Start: 2018-08-26 | End: 2018-08-26 | Stop reason: HOSPADM

## 2018-08-26 RX ORDER — ONDANSETRON 2 MG/ML
4 INJECTION INTRAMUSCULAR; INTRAVENOUS
Status: DISCONTINUED | OUTPATIENT
Start: 2018-08-26 | End: 2018-08-26

## 2018-08-26 RX ORDER — HYDROCODONE BITARTRATE AND ACETAMINOPHEN 5; 325 MG/1; MG/1
1 TABLET ORAL
Status: DISCONTINUED | OUTPATIENT
Start: 2018-08-26 | End: 2018-08-26 | Stop reason: HOSPADM

## 2018-08-26 RX ORDER — SODIUM CHLORIDE, SODIUM LACTATE, POTASSIUM CHLORIDE, CALCIUM CHLORIDE 600; 310; 30; 20 MG/100ML; MG/100ML; MG/100ML; MG/100ML
25 INJECTION, SOLUTION INTRAVENOUS CONTINUOUS
Status: DISCONTINUED | OUTPATIENT
Start: 2018-08-27 | End: 2018-08-26 | Stop reason: HOSPADM

## 2018-08-26 RX ORDER — FENTANYL CITRATE 50 UG/ML
INJECTION, SOLUTION INTRAMUSCULAR; INTRAVENOUS AS NEEDED
Status: DISCONTINUED | OUTPATIENT
Start: 2018-08-26 | End: 2018-08-26 | Stop reason: HOSPADM

## 2018-08-26 RX ORDER — LANOLIN ALCOHOL/MO/W.PET/CERES
325 CREAM (GRAM) TOPICAL
Qty: 30 TAB | Refills: 2 | Status: SHIPPED | OUTPATIENT
Start: 2018-08-26 | End: 2020-05-27

## 2018-08-26 RX ORDER — OXYTOCIN/RINGER'S LACTATE 20/1000 ML
PLASTIC BAG, INJECTION (ML) INTRAVENOUS
Status: DISCONTINUED | OUTPATIENT
Start: 2018-08-26 | End: 2018-08-26 | Stop reason: HOSPADM

## 2018-08-26 RX ORDER — LANOLIN ALCOHOL/MO/W.PET/CERES
325 CREAM (GRAM) TOPICAL
Qty: 30 TAB | Refills: 2 | Status: SHIPPED | OUTPATIENT
Start: 2018-08-26 | End: 2018-08-26

## 2018-08-26 RX ORDER — HYDROCODONE BITARTRATE AND ACETAMINOPHEN 5; 325 MG/1; MG/1
1 TABLET ORAL
Qty: 10 TAB | Refills: 0 | Status: SHIPPED | OUTPATIENT
Start: 2018-08-26 | End: 2018-10-22 | Stop reason: ALTCHOICE

## 2018-08-26 RX ORDER — IBUPROFEN 600 MG/1
600 TABLET ORAL
Qty: 30 TAB | Refills: 1 | Status: SHIPPED | OUTPATIENT
Start: 2018-08-26

## 2018-08-26 RX ORDER — MIDAZOLAM HYDROCHLORIDE 1 MG/ML
INJECTION, SOLUTION INTRAMUSCULAR; INTRAVENOUS AS NEEDED
Status: DISCONTINUED | OUTPATIENT
Start: 2018-08-26 | End: 2018-08-26 | Stop reason: HOSPADM

## 2018-08-26 RX ORDER — PROPOFOL 10 MG/ML
INJECTION, EMULSION INTRAVENOUS AS NEEDED
Status: DISCONTINUED | OUTPATIENT
Start: 2018-08-26 | End: 2018-08-26 | Stop reason: HOSPADM

## 2018-08-26 RX ORDER — DIPHENHYDRAMINE HYDROCHLORIDE 50 MG/ML
12.5 INJECTION, SOLUTION INTRAMUSCULAR; INTRAVENOUS
Status: DISCONTINUED | OUTPATIENT
Start: 2018-08-26 | End: 2018-08-26

## 2018-08-26 RX ORDER — SODIUM CHLORIDE 0.9 % (FLUSH) 0.9 %
5-10 SYRINGE (ML) INJECTION EVERY 8 HOURS
Status: DISCONTINUED | OUTPATIENT
Start: 2018-08-26 | End: 2018-08-26

## 2018-08-26 RX ADMIN — Medication: at 01:15

## 2018-08-26 RX ADMIN — PROPOFOL 50 MG: 10 INJECTION, EMULSION INTRAVENOUS at 01:05

## 2018-08-26 RX ADMIN — FENTANYL CITRATE 50 MCG: 50 INJECTION, SOLUTION INTRAMUSCULAR; INTRAVENOUS at 00:49

## 2018-08-26 RX ADMIN — FENTANYL CITRATE 50 MCG: 50 INJECTION, SOLUTION INTRAMUSCULAR; INTRAVENOUS at 01:29

## 2018-08-26 RX ADMIN — LIDOCAINE HYDROCHLORIDE 40 MG: 20 INJECTION, SOLUTION EPIDURAL; INFILTRATION; INTRACAUDAL; PERINEURAL at 00:49

## 2018-08-26 RX ADMIN — ONDANSETRON 4 MG: 2 INJECTION, SOLUTION INTRAMUSCULAR; INTRAVENOUS at 03:42

## 2018-08-26 RX ADMIN — PROPOFOL 50 MG: 10 INJECTION, EMULSION INTRAVENOUS at 01:13

## 2018-08-26 RX ADMIN — MIDAZOLAM HYDROCHLORIDE 2 MG: 1 INJECTION, SOLUTION INTRAMUSCULAR; INTRAVENOUS at 00:43

## 2018-08-26 RX ADMIN — PROPOFOL 50 MG: 10 INJECTION, EMULSION INTRAVENOUS at 00:55

## 2018-08-26 RX ADMIN — Medication 10 ML: at 02:32

## 2018-08-26 RX ADMIN — KETOROLAC TROMETHAMINE 30 MG: 30 INJECTION, SOLUTION INTRAMUSCULAR; INTRAVENOUS at 01:40

## 2018-08-26 RX ADMIN — PROPOFOL 100 MG: 10 INJECTION, EMULSION INTRAVENOUS at 00:49

## 2018-08-26 RX ADMIN — SODIUM CHLORIDE, SODIUM LACTATE, POTASSIUM CHLORIDE, CALCIUM CHLORIDE: 600; 310; 30; 20 INJECTION, SOLUTION INTRAVENOUS at 00:43

## 2018-08-26 NOTE — PROGRESS NOTES
Please excuse Mona Freeman from work through Wednesday. August 29th, 2018. She was in the hospital over the weekend and needs time to recover.    Thank you  Emma Chavez, [address] [facility]  [phone]

## 2018-08-26 NOTE — ANESTHESIA PREPROCEDURE EVALUATION
Anesthetic History   No history of anesthetic complications            Review of Systems / Medical History  Patient summary reviewed, nursing notes reviewed and pertinent labs reviewed    Pulmonary          Smoker         Neuro/Psych         Neuromuscular disease     Cardiovascular    Hypertension              Exercise tolerance: >4 METS     GI/Hepatic/Renal  Within defined limits              Endo/Other  Within defined limits           Other Findings   Comments: Incomplete induced  complicated by hemorrhage          Physical Exam    Airway  Mallampati: II  TM Distance: > 6 cm  Neck ROM: normal range of motion   Mouth opening: Normal     Cardiovascular  Regular rate and rhythm,  S1 and S2 normal,  no murmur, click, rub, or gallop             Dental         Pulmonary  Breath sounds clear to auscultation               Abdominal  GI exam deferred       Other Findings            Anesthetic Plan    ASA: 2  Anesthesia type: MAC and total IV anesthesia          Induction: Intravenous  Anesthetic plan and risks discussed with: Patient

## 2018-08-26 NOTE — PROGRESS NOTES
Received call from ED that patient called hospital about prescriptions that were supposedly sent electronically not at pharmacy. Does not appear that iron or ibuprofen were sent. I called these in to [address] [facility]. [facility] [address] MARIA LUZ BOSTON at [phone].

## 2018-08-26 NOTE — ED NOTES
TRANSFER - OUT REPORT:    Bedside and Verbal report given to Rosalinda Laura RN on [facility]  being transferred to OR(unit) for ordered procedure       Report consisted of patients Situation, Background, Assessment and   Recommendations(SBAR). Information from the following report(s) SBAR, Kardex, ED Summary, [facility] and Recent Results was reviewed with the receiving nurse. Lines:   Peripheral IV 08/25/18 Right Antecubital (Active)   Site Assessment Clean, dry, & intact 8/25/2018  9:57 PM   Phlebitis Assessment 0 8/25/2018  9:57 PM   Infiltration Assessment 0 8/25/2018  9:57 PM   Dressing Status Clean, dry, & intact 8/25/2018  9:57 PM   Dressing Type Transparent 8/25/2018  9:57 PM        Opportunity for questions and clarification was provided.       Patient transported with:   Registered Nurse

## 2018-08-26 NOTE — PROGRESS NOTES
RN reviewed all discharge instructions with patient. Patient's prescriptions sent electronically to the requested pharmacy. RN specifically educated patient on the importance of filling and taking her Iron Rx as directed and to use Ibuprofen for pain management. An additional Rx was given for pain medication and RN explained the patient cannot drive while taking this medication. Work excuse was also given to the patient. The patient did not have any remaining questions and was then discharged to her own vehicle, transported via wheelchair and RN accompanied patient to her car.

## 2018-08-26 NOTE — PROCEDURES
SUCTION CURETTAGE FULL OP NOTE    Josselyn Palmer  [id number]  1985      DATE OF PROCEDURE:  2018    PREOPERATIVE DIAGNOSIS:  Incomplete induced  complicated by hemorrhage [O07.1]    POSTOPERATIVE DIAGNOSIS:  Incomplete induced  complicated by hemorrhage [O07.1]    PROCEDURE: Procedure(s):  DILATATION AND CURETTAGE WITH SUCTION     SURGEON:  Mary Syed MD    ASSISTANT:     ANESTHESIA:general    EBL: less than 50 ml    SPECIMENS: Products of conception    FINDINGS: Dilated cervix to 1 cm. Moderate tissue obtained    DESCRIPTION OF PROCEDURE: The patient was placed on the operating room table in the supine position and placed under general endotracheal anesthesia. Time out was done to confirm the operating procedure, surgeon, patient and site. Once confirmed by the team, procedure was started. PROCEDURE: Patient was placed on the operating table in the supine position and after induction of anesthesia she was placed in the dorsal lithotomy position and prepped and draped in the usual fashion for vaginal surgery. [address] cath preformed and 150 cc of clear mony urine obtained. EUA preformed and 8 week sized axial uterus noted. Cervix was exposed with a weighted vaginal speculum and grasped with a single-tooth tenaculum. A curved #7 suction curette device was then introduced into the endometrial cavity after it was sounded gently and easily to approximately 10  cm. Thorough suction curettage followed by sharp curettage with a large curette followed again by suction curettage was performed until the suction returned no further clot or products of conception. Good cri and adequate curettage was felt to be obtained. The uterus was massaged. Hemostasis appeared normal, Instruments were removed. The patient went to the recovery room in satisfactory condition. All counts were correct times two. Of note blood type was RH positive.

## 2018-08-26 NOTE — PROGRESS NOTES
Subjective  Pt doing well. States bleeding is light, pain well controlled.    Temp:  [97.8 °F (36.6 °C)-99 °F (37.2 °C)]   Pulse (Heart Rate):  []   BP: (110-195)/()   Resp Rate:  [12-22]   O2 Sat (%):  [97 %-100 %]   Weight:  [84.4 kg (186 lb)]   [unfilled]  [unfilled]  Objective   Active Problems:    Incomplete  with delayed or excessive hemorrhage (2018)      Assessment & Plan  Discharge home today  Warning signs discussed, pt verbalized understanding

## 2018-08-26 NOTE — ANESTHESIA POSTPROCEDURE EVALUATION
Post-Anesthesia Evaluation and Assessment    Patient: Jesse Parker MRN: [id number]  SSN: [id number]    YOB: 1985  Age: 28 y.o. Sex: female       Cardiovascular Function/Vital Signs  Visit Vitals    BP (!) 133/93    Pulse 84    Temp 36.9 °C (98.4 °F)    Resp 16    Ht 5' 7\" (1.702 m)    Wt 84.4 kg (186 lb)    SpO2 100%    BMI 29.13 kg/m2       Patient is status post MAC anesthesia for Procedure(s):  DILATATION AND CURETTAGE WITH SUCTION. Nausea/Vomiting: None    Postoperative hydration reviewed and adequate. Pain:  Pain Scale 1: Numeric (0 - 10) (08/25/18 2104)  Pain Intensity 1: 5 (08/25/18 2104)   Managed    Neurological Status: At baseline    Mental Status and Level of Consciousness: Arousable    Pulmonary Status:   O2 Device: Nasal cannula (08/26/18 0136)   Adequate oxygenation and airway patent    Complications related to anesthesia: None    Post-anesthesia assessment completed.  No concerns    Signed By: Brody Ramirez MD     August 26, 2018

## 2018-08-26 NOTE — ED NOTES
Dr. Clayton Cast at bedside to perform pelvic exam as assisted by this RN. Per Dr. Clayton Cast, pt to go to OR for urgent D&C. Consents labeled and given to Dr. Clayton Cast.

## 2018-08-26 NOTE — H&P
Gynecology History and Physical    Name: Johnny Real MRN: [id number] SSN: [id number]    YOB: 1985  Age: 28 y.o. Sex: female       Subjective:      Chief complaint:  Incomplete     Kiesha Jones is a 28 y.o.  female with a history of several hours of heavy vaginal bleeding with passage of clots. Pt was diagnosed with MAB 2 days ago having showed no G-sac growth x 9 days and plateauing BHCGs. Pt is scheduled for D&C on Monday by Dr Goran Alvarez. She denies h/o ectopic,PID,Chlamydia,or GC. She has had moderate central pelvic cramping which has improved slightly since presentation. The current method of family planning is none. OB History      Para Term  AB Living    6 3 1 1 2 1    SAB TAB Ectopic Molar Multiple Live Births    2    0 2        Past Medical History:   Diagnosis Date    Abnormal Papanicolaou smear of cervix     Anemia NEC     with pregnancy. Taking IRON    Chlamydia     Essential hypertension     Hypertension     Infertility     cerclage, prev sab    Ovarian cyst      Past Surgical History:   Procedure Laterality Date    HX DILATION AND CURETTAGE      HX GYN      Cerclage    HX LAP CHOLECYSTECTOMY       Social History     Occupational History    Not on file. Social History Main Topics    Smoking status: Former Smoker    Smokeless tobacco: Never Used      Comment: one or two cigarettes a month    Alcohol use 1.8 oz/week     3 Glasses of wine per week      Comment: \"few times a week\"    Drug use: No    Sexual activity: Yes     Partners: Male     Birth control/ protection: None     Family History   Problem Relation Age of Onset    Stroke Mother     Diabetes Father     Hypertension Father     Diabetes Sister         No Known Allergies  Prior to Admission medications    Medication Sig Start Date End Date Taking? Authorizing Provider   hydroCHLOROthiazide (HYDRODIURIL) 25 mg tablet Take 1 Tab by mouth daily.   Patient taking differently: Take 25 mg by mouth every evening. 8/17/18  Yes Lalo Sherman MD   amLODIPine (NORVASC) 5 mg tablet Take 1 Tab by mouth daily. Patient taking differently: Take 5 mg by mouth every evening. 8/17/18  Yes Lalo Sherman MD        Review of Systems  Constitutional: negative for fevers, chills and sweats  Respiratory: negative for cough, sputum, hemoptysis, asthma or wheezing  Cardiovascular: negative for chest pain, chest pressure/discomfort, dyspnea, palpitations, irregular heart beats, near-syncope  Gastrointestinal: negative for nausea, vomiting, diarrhea and constipation  Genitourinary:negative for frequency, dysuria, nocturia and hematuria  Musculoskeletal:negative for myalgias, arthralgias, neck pain and back pain  Neurological: negative for headaches and dizziness    Objective:     Vitals:    08/25/18 2230 08/25/18 2245 08/25/18 2300 08/25/18 2315   BP: (!) 161/106 (!) 163/111 (!) 146/105 (!) 165/113   Pulse:  73 78 93   Resp:  17 18 19   Temp:       SpO2:  100% 100% 100%   Weight:       Height:           Physical Exam:  Patient without distress. Heart: Regular rate and rhythm, S1S2 present or without murmur or extra heart sounds  Lung: clear to auscultation throughout lung fields, no wheezes, no rales, no rhonchi and normal respiratory effort  Back: costovertebral angle tenderness absent  Abdomen: soft, nontender, nondistended, without guarding, without rebound, no masses palpated, no hepatosplenomegaly  External Genitalia: normal general appearance  Urinary system: urethral meatus normal  Vagina: normal mucosa without prolapse or lesions and large clot in vault. Active bleeding moderate  Cervix: normal appearance and FT dilated  Adnexa: normal bimanual exam  Uterus: mid-position, tender and enlarged to 6 weeks  Skin: Good cap refill  Neck: no adenopathy or thyroidmegaly    Assessment/Plan:     Active Problems:    * No active hospital problems.  *     1) Incomplete Ab with hemorrhage    Plan: D&C,IVF, OR team called. Risks of D&C discussed extensively,options,pt desires D&C.       Signed By:  Maria Elena Carreon MD     August 25, 2018

## 2018-08-26 NOTE — ED PROVIDER NOTES
EMERGENCY DEPARTMENT HISTORY AND PHYSICAL EXAM      Date: 2018  Patient Name: Enedina Camargo    History of Presenting Illness     Chief Complaint   Patient presents with    Vaginal Bleeding     Pt ambulatory to triage states she is scheduled to have D&X this Monday, however experienced heavy vaginal bledding starting this evening; pt states noting clots; History Provided By: Patient    HPI: Enedina Camargo, 28 y.o. female presents to the ED with cc of vaginal bleeding. Pt notes that her LNMP was 18. She was being evaluated by [facility] and her HCG levels were not rising as they should. Pt was scheduled for a D&C on . Pt states that several hours ago she began with very extreme lower abdominal cramping and bleeding. She notes that the cramping is much improved since onset but she continues to have heavy bleeding. She note that she is passing clots the size of small fruit. There has been no treatment PTA. 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 There are no other complaints, changes, or physical findings at this time. Social Hx: Tobacco (former smoker), EtOH (social), Illicit drug use (denies)     PCP: PROVIDER UNKNOWN   OB/GYN: Dr. Darwin Still    Current Outpatient Prescriptions   Medication Sig Dispense Refill    hydroCHLOROthiazide (HYDRODIURIL) 25 mg tablet Take 1 Tab by mouth daily. (Patient taking differently: Take 25 mg by mouth every evening.) 30 Tab 0    amLODIPine (NORVASC) 5 mg tablet Take 1 Tab by mouth daily. (Patient taking differently: Take 5 mg by mouth every evening.) 30 Tab 0       Past History     Past Medical History:  Past Medical History:   Diagnosis Date    Abnormal Papanicolaou smear of cervix     Anemia NEC     with pregnancy.  Taking IRON    Chlamydia     Essential hypertension     Hypertension     Infertility     cerclage, prev sab    Ovarian cyst Past Surgical History:  Past Surgical History:   Procedure Laterality Date    HX DILATION AND CURETTAGE      HX GYN      Cerclage    HX LAP CHOLECYSTECTOMY  2008       Family History:  Family History   Problem Relation Age of Onset    Stroke Mother     Diabetes Father     Hypertension Father     Diabetes Sister        Social History:  Social History   Substance Use Topics    Smoking status: Former Smoker    Smokeless tobacco: Never Used      Comment: one or two cigarettes a month    Alcohol use 1.8 oz/week     3 Glasses of wine per week      Comment: \"few times a week\"       Allergies:  No Known Allergies      Review of Systems   Review of Systems   Constitutional: Negative for chills, diaphoresis and fever. HENT: Negative for congestion, ear pain, rhinorrhea and sore throat. Respiratory: Negative for cough and shortness of breath. Cardiovascular: Negative for chest pain and palpitations. Gastrointestinal: Negative for abdominal pain, constipation, diarrhea, nausea and vomiting. Genitourinary: Positive for pelvic pain and vaginal bleeding. Negative for difficulty urinating, dysuria, frequency, hematuria, vaginal discharge and vaginal pain. Musculoskeletal: Negative for arthralgias and myalgias. Neurological: Negative for dizziness, syncope, weakness and headaches. All other systems reviewed and are negative. Physical Exam   Physical Exam   Constitutional: She is oriented to person, place, and time. She appears well-developed and well-nourished. No distress. 28 y.o. -American female    HENT:   Head: Normocephalic and atraumatic. Eyes: Conjunctivae are normal. Right eye exhibits no discharge. Left eye exhibits no discharge. Neck: Normal range of motion. Neck supple. Cardiovascular: Normal rate, regular rhythm and normal heart sounds. No murmur heard. Pulmonary/Chest: Effort normal and breath sounds normal. No respiratory distress. Abdominal: Soft.  Normal appearance and bowel sounds are normal. She exhibits no distension. There is tenderness in the suprapubic area. There is no rebound and no guarding. Neurological: She is alert and oriented to person, place, and time. Skin: Skin is warm and dry. She is not diaphoretic. Psychiatric: She has a normal mood and affect. Her behavior is normal.   Nursing note and vitals reviewed. Diagnostic Study Results     Labs -     Recent Results (from the past 12 hour(s))   CBC WITH AUTOMATED DIFF    Collection Time: 08/25/18  9:51 PM   Result Value Ref Range    WBC 8.8 3.6 - 11.0 K/uL    RBC 4.52 3.80 - 5.20 M/uL    HGB 11.5 11.5 - 16.0 g/dL    HCT 35.9 35.0 - 47.0 %    MCV 79.4 (L) 80.0 - 99.0 FL    MCH 25.4 (L) 26.0 - 34.0 PG    MCHC 32.0 30.0 - 36.5 g/dL    RDW 18.7 (H) 11.5 - 14.5 %    PLATELET 499 912 - 041 K/uL    MPV 10.9 8.9 - 12.9 FL    NRBC 0.0 0  WBC    ABSOLUTE NRBC 0.00 0.00 - 0.01 K/uL    NEUTROPHILS 57 32 - 75 %    LYMPHOCYTES 33 12 - 49 %    MONOCYTES 7 5 - 13 %    EOSINOPHILS 2 0 - 7 %    BASOPHILS 1 0 - 1 %    IMMATURE GRANULOCYTES 0 0.0 - 0.5 %    ABS. NEUTROPHILS 5.0 1.8 - 8.0 K/UL    ABS. LYMPHOCYTES 2.9 0.8 - 3.5 K/UL    ABS. MONOCYTES 0.7 0.0 - 1.0 K/UL    ABS. EOSINOPHILS 0.2 0.0 - 0.4 K/UL    ABS. BASOPHILS 0.0 0.0 - 0.1 K/UL    ABS. IMM. GRANS. 0.0 0.00 - 0.04 K/UL    DF AUTOMATED     METABOLIC PANEL, COMPREHENSIVE    Collection Time: 08/25/18  9:51 PM   Result Value Ref Range    Sodium 139 136 - 145 mmol/L    Potassium 3.0 (L) 3.5 - 5.1 mmol/L    Chloride 105 97 - 108 mmol/L    CO2 28 21 - 32 mmol/L    Anion gap 6 5 - 15 mmol/L    Glucose 89 65 - 100 mg/dL    BUN 17 6 - 20 MG/DL    Creatinine 0.88 0.55 - 1.02 MG/DL    BUN/Creatinine ratio 19 12 - 20      GFR est AA >60 >60 ml/min/1.73m2    GFR est non-AA >60 >60 ml/min/1.73m2    Calcium 8.5 8.5 - 10.1 MG/DL    Bilirubin, total 0.2 0.2 - 1.0 MG/DL    ALT (SGPT) 19 12 - 78 U/L    AST (SGOT) 13 (L) 15 - 37 U/L    Alk.  phosphatase 51 45 - 117 U/L    Protein, total 6.9 6.4 - 8.2 g/dL    Albumin 3.7 3.5 - 5.0 g/dL    Globulin 3.2 2.0 - 4.0 g/dL    A-G Ratio 1.2 1.1 - 2.2     BLOOD TYPE, (ABO+RH)    Collection Time: 08/25/18  9:51 PM   Result Value Ref Range    ABO/Rh(D) O POSITIVE     Comment SAMPLE NOT USABLE FOR CROSSMATCH    TOTAL HCG, QT. Collection Time: 08/25/18  9:51 PM   Result Value Ref Range    Beta HCG, QT 2930 (H) 0 - 6 MIU/ML       Radiologic Studies - None    Medical Decision Making   I am the first provider for this patient. I reviewed the vital signs, available nursing notes, past medical history, past surgical history, family history and social history. Vital Signs-Reviewed the patient's vital signs. Patient Vitals for the past 12 hrs:   Temp Pulse Resp BP SpO2   08/25/18 2315 - 93 19 (!) 165/113 100 %   08/25/18 2300 - 78 18 (!) 146/105 100 %   08/25/18 2245 - 73 17 (!) 163/111 100 %   08/25/18 2230 - - - (!) 161/106 -   08/25/18 2229 - 90 20 - 100 %   08/25/18 2215 - 89 21 (!) 163/130 100 %   08/25/18 2205 - 89 15 (!) 149/106 100 %   08/25/18 2202 - 76 17 (!) 166/117 100 %   08/25/18 2200 - 77 16 (!) 160/102 100 %   08/25/18 2145 - 75 21 (!) 195/126 99 %   08/25/18 2135 - 81 19 - 99 %   08/25/18 2134 - - - (!) 157/104 -   08/25/18 2104 99 °F (37.2 °C) 100 18 (!) 181/113 100 %       Pulse Oximetry Analysis - 100% on RA    Cardiac Monitor:   Rate: 100 bpm    Records Reviewed: Nursing Notes    Provider Notes (Medical Decision Making): Abnormal vaginal bleeding, miscarriage, anemia,     ED Course:   Initial assessment performed. The patients presenting problems have been discussed, and they are in agreement with the care plan formulated and outlined with them. I have encouraged them to ask questions as they arise throughout their visit. 9:32 PM  Pt used the bed side commode to collect urine specimen. It is filled with blood and clots noted the size of golf balls. 10:25 PM  Pt has been re-evaluated.  She continues to have heavy vaginal bleeding and passing clots. Still pending orthostatic vital signs. Initial Hbg 11.5. Discussed with Dr. Jessica Brumfield. Recommends consulting OB hospitalist.     CONSULT NOTE:  10:44 PM  PARRIS Metz spoke with Dr. Damian Kussmaul, Consult for OB. Discussed available diagnostic tests and clinical findings. He is in agreement with care plans as outlined. He will come to the ED to evaluate the patient. 11:29 PM  Dr. Damian Kussmaul has evaluated the patient and will taken patient to the OR for D&C. Critical Care Time:   Denies    Admit Note:  11:30 PM  Pt is being admitted by Dr. Damian Kussmaul. The results of their tests and reason(s) for their admission have been discussed with pt and/or available family. They convey agreement and understanding for the need to be admitted and for admission diagnosis. PLAN:  Admit     Diagnosis     Clinical Impression:   1.  Incomplete  with complication

## 2018-08-26 NOTE — PERIOP NOTES
TRANSFER - OUT REPORT:    Verbal report given to Hernesto [address] (name) on Humera  being transferred to [facility](unit) for routine post - op       Report consisted of patients Situation, Background, Assessment and   Recommendations(SBAR). Information from the following report(s) SBAR, Kardex, OR Summary, Intake/Output, MAR and Recent Results was reviewed with the receiving nurse. Opportunity for questions and clarification was provided.       Patient transported with:   Registered Nurse

## 2018-08-26 NOTE — PROGRESS NOTES
This CM acknowledged discharge order. Failed attempt to see patient. Patient discharged by nursing.     [address] [facility], [place]  Ext 1167

## 2018-08-26 NOTE — ED NOTES
Pt has had 3 more occurrences of passing clots. Pericare performed each time. Pt tolerated well. VSS.

## 2018-08-26 NOTE — ED NOTES
Patients belongings placed in belongings bag at the bedside.  Patient does not have any family coming to the hospital.

## 2018-08-26 NOTE — ED NOTES
Pt has had 3 occurences (at this time) of passing large bright red blood clots thru her vagina. Clots range in size from golf ball to orange/grapefruit in size. Pt asymptomatic; denies SOB, denies dizziness, denies lightheadedness. Discussed with PARRIS Gasca, no new orders received at this time.

## 2018-08-28 LAB — HGB BLD-MCNC: 9.8 G/DL (ref 11.5–16)

## 2018-10-22 ENCOUNTER — OFFICE VISIT (OUTPATIENT)
Dept: FAMILY MEDICINE CLINIC | Age: 33
End: 2018-10-22

## 2018-10-22 VITALS
HEART RATE: 87 BPM | HEIGHT: 68 IN | WEIGHT: 180 LBS | TEMPERATURE: 98.7 F | BODY MASS INDEX: 27.28 KG/M2 | DIASTOLIC BLOOD PRESSURE: 101 MMHG | OXYGEN SATURATION: 98 % | SYSTOLIC BLOOD PRESSURE: 153 MMHG | RESPIRATION RATE: 16 BRPM

## 2018-10-22 DIAGNOSIS — D64.9 POSTOPERATIVE ANEMIA: ICD-10-CM

## 2018-10-22 DIAGNOSIS — I10 RESISTANT HYPERTENSION: Primary | ICD-10-CM

## 2018-10-22 DIAGNOSIS — I10 RESISTANT HYPERTENSION: ICD-10-CM

## 2018-10-22 RX ORDER — ATENOLOL 50 MG/1
50 TABLET ORAL DAILY
Qty: 30 TAB | Refills: 1 | Status: SHIPPED | OUTPATIENT
Start: 2018-10-22 | End: 2020-05-27

## 2018-10-22 RX ORDER — LOSARTAN POTASSIUM AND HYDROCHLOROTHIAZIDE 25; 100 MG/1; MG/1
1 TABLET ORAL
Qty: 30 TAB | Refills: 1 | Status: SHIPPED | OUTPATIENT
Start: 2018-10-22 | End: 2020-05-27

## 2018-10-22 NOTE — PROGRESS NOTES
Subjective: Chief Complaint Patient presents with [facility] Establish Care New patient She  is a 28 y.o. female who presents for evaluation of: 
New pt to [facility] care. PMH of HTN. Being seen for GYN and recently had D&C after a miscarriage in 8/2018. Has 2 jobs that she works daily and 1 additional job that she works every Friday. HTN - dx at 16 yrs old. Has been on a few meds over the years but has had some issues with compliance. Had done ok with HCTZ and labetolol during pregnancy. Was put on amlodipine but had trouble with significant headaches while taking this. No new myalgias, no joint pains, no weakness No TIA's, no chest pain on exertion, no dyspnea on exertion, no swelling of ankles. Exercising - No 
Dieting - No 
Smoking - No 
 
EZRA screening 1. Snoring - Do you snore loudly (louder than talking or loud enough to be heard through closed doors)? N 
2. Tired - Do you often feel tired, fatigued, or sleepy during daytime? N 
3. Observed - Has anyone observed you stop breathing during your sleep? N 
4. Blood pressure - Do you have or are you being treated for high blood pressure? Y 
5. BMI - BMI more than 35 kg/m2? N 
10. Age - Age over 48 yr old? N 
7. Neck circumference - Neck circumference greater than 40 cm? N 
8. Gender - Gender male? Yosi Akhtar High risk of EZRA: >5 Mod risk of EZRA: 3-5 Low risk of EZRA: <3 Lab Results Component Value Date/Time Cholesterol, total 156 04/01/2015 01:30 PM  
 HDL Cholesterol 28 04/01/2015 01:30 PM  
 LDL, calculated 73.8 04/01/2015 01:30 PM  
 Triglyceride 271 (H) 04/01/2015 01:30 PM  
 
ROS Gen - no fever/chills Resp - no dyspnea or cough CV - no chest pain or PRATT Rest per HPI Past Medical History:  
Diagnosis Date  Abnormal Papanicolaou smear of cervix  Anemia NEC   
 with pregnancy. Taking IRON  
 Chlamydia  Essential hypertension  Hypertension  Infertility   
 cerclage, prev sab  
 Ovarian cyst   
 
 Past Surgical History:  
Procedure Laterality Date  HX CHOLECYSTECTOMY  HX DILATION AND CURETTAGE    
 HX GYN Cerclage  HX LAP CHOLECYSTECTOMY  2008 Current Outpatient Medications on File Prior to Visit Medication Sig Dispense Refill  ibuprofen (MOTRIN) 600 mg tablet Take 1 Tab by mouth every six (6) hours as needed for Pain. 30 Tab 1  
 amLODIPine (NORVASC) 5 mg tablet Take 1 Tab by mouth daily. (Patient taking differently: Take 5 mg by mouth every evening.) 30 Tab 0  
 ferrous sulfate (IRON) 325 mg (65 mg iron) tablet Take 1 Tab by mouth Daily (before breakfast). 30 Tab 2 No current facility-administered medications on file prior to visit. Objective:  
 
Vitals:  
 10/22/18 1307 BP: (!) 153/101 Pulse: 87 Resp: 16 Temp: 98.7 °F (37.1 °C) TempSrc: Oral  
SpO2: 98% Weight: 180 lb (81.6 kg) Height: 5' 7.5\" (1.715 m) Physical Examination: 
General appearance - alert, well appearing, and in no distress Eyes -sclera anicteric Nose - + nasal salute Neck - supple, no significant adenopathy, no thyromegaly, no bruits Chest - clear to auscultation, no wheezes, rales or rhonchi, symmetric air entry Heart - normal rate, regular rhythm, normal S1, S2, no murmurs, rubs, clicks or gallops Abd - soft, NT, ND, +BS Neurological - alert, oriented, no focal findings or movement disorder noted Extremitiesno edema Psychnormal mood and affect Assessment/ Plan:  
Diagnoses and all orders for this visit: 1. Resistant hypertensionBP too high.  will use losartan/HCTZ and atenolol instead of amlodipine and HCTZ. Slowly will titrate up over the next 3 days and then follow-up after this. Patient does not remember having a workup for secondary causes of hypertension even when she was diagnosed back as a 16year-old. We will get significant labs, 24-hour urine, and ultrasound of kidneys.   We will also have patient work on low-salt diet and more aggressively work on weight loss with diet and exercise. -     CBC W/O DIFF 
-     METABOLIC PANEL, COMPREHENSIVE 
-     TSH 3RD GENERATION 
-     MICROALBUMIN, UR, RAND W/ MICROALB/CREAT RATIO 
-     CREATININE, UR, 24 HR; Future -     SODIUM, UR, 24 HR; Future -     ALDOSTERONE, UR; Future 
-     losartan-hydroCHLOROthiazide (HYZAAR) 100-25 mg per tablet; Take 1 Tab by mouth every morning. 
-     atenolol (TENORMIN) 50 mg tablet; Take 1 Tab by mouth daily. 
-     US RETROPERITONEUM COMP; Future 2. Postoperative anemiano symptoms, rechecking labs -     CBC W/O DIFF I have discussed the diagnosis with the patient and the intended plan as seen in the above orders. The patient has received an after-visit summary and questions were answered concerning future plans. I have discussed medication side effects and warnings with the patient as well. The patient verbalizes understanding and agreement with the plan. Follow-up Disposition: 
Return in about 4 weeks (around 11/19/2018).

## 2018-10-22 NOTE — PATIENT INSTRUCTIONS
For your blood pressure, we are adjusting your medications. - Please stop taking hydrochlorothiazide and amlodipine. - We will replace this with losartan/HCTZ 100 mg / 25 mg to be taken daily every morning as this will increase your urination AND Atenolol 50 mg daily  
 
- Since this will be a big change, I would like for you to take 1/2 tablet of each of these new medications daily for 3 days and then increase to a full tablet of both of these medications daily. This will avoid dropping your blood pressure too low.

## 2018-10-22 NOTE — PROGRESS NOTES
Chief Complaint Patient presents with [facility] Establish Care New patient Patient has recently had IUD after D&C and GYN has been treating HTN.  
Room 8

## 2019-02-26 ENCOUNTER — APPOINTMENT (OUTPATIENT)
Dept: GENERAL RADIOLOGY | Age: 34
End: 2019-02-26
Attending: EMERGENCY MEDICINE
Payer: SELF-PAY

## 2019-02-26 ENCOUNTER — HOSPITAL ENCOUNTER (EMERGENCY)
Age: 34
Discharge: HOME | End: 2019-02-26
Attending: EMERGENCY MEDICINE
Payer: SELF-PAY

## 2019-02-26 VITALS
WEIGHT: 178.79 LBS | RESPIRATION RATE: 16 BRPM | SYSTOLIC BLOOD PRESSURE: 188 MMHG | TEMPERATURE: 98.1 F | BODY MASS INDEX: 27.59 KG/M2 | OXYGEN SATURATION: 100 % | HEART RATE: 86 BPM | DIASTOLIC BLOOD PRESSURE: 128 MMHG

## 2019-02-26 DIAGNOSIS — I10 ESSENTIAL HYPERTENSION: ICD-10-CM

## 2019-02-26 DIAGNOSIS — B37.31 VAGINAL YEAST INFECTION: ICD-10-CM

## 2019-02-26 DIAGNOSIS — R07.89 CHEST WALL PAIN: ICD-10-CM

## 2019-02-26 DIAGNOSIS — J20.9 ACUTE BRONCHITIS, UNSPECIFIED ORGANISM: Primary | ICD-10-CM

## 2019-02-26 PROCEDURE — 99281 EMR DPT VST MAYX REQ PHY/QHP: CPT

## 2019-02-26 RX ORDER — PREDNISONE 20 MG/1
40 TABLET ORAL DAILY
Qty: 10 TAB | Refills: 0 | Status: SHIPPED | OUTPATIENT
Start: 2019-02-26 | End: 2019-03-03

## 2019-02-26 RX ORDER — BENZONATATE 100 MG/1
200 CAPSULE ORAL
Qty: 15 CAP | Refills: 0 | Status: SHIPPED | OUTPATIENT
Start: 2019-02-26 | End: 2019-02-26

## 2019-02-26 RX ORDER — BENZONATATE 100 MG/1
200 CAPSULE ORAL
Qty: 15 CAP | Refills: 0 | Status: SHIPPED | OUTPATIENT
Start: 2019-02-26 | End: 2020-05-27

## 2019-02-26 NOTE — ED PROVIDER NOTES
EMERGENCY DEPARTMENT HISTORY AND PHYSICAL EXAM 
 
 
Date: 2/26/2019 Patient Name: Jorge Cantu History of Presenting Illness Chief Complaint Patient presents with  Cough Ambulatory c/o \"cold\" ongoing x 3 weeks Pt reports symptoms get better and then come back Pt reports x 4 days increased cough +yellow sputum History Provided By: Patient HPI: Jorge Cantu, 35 y.o. female with PMHx significant for ovarian cyst, HTN and Chlamydia, presents ambulatory to the ED with cc of gradual onset, constant, worsening midsternal CP x 1 week with associated lightheadedness and a cough x 3 weeks. Pain is mild-moderate in intensity and elicited by coughing or deep breaths. Pt states she has been struggling with cold-like symptoms for 3 weeks mostly surrounding her cough, and last week she began to present with CP elicited by her cough. Worsening of the pain and presentation of lightheadedness while at work today prompted the trip to the ED for evaluation. She reports she has HTN and has medications but has not taken them recently. She denies any heart or lung problems. She notes no daily exposure to smoke or noxious fumes at work. She denies any sick contacts. Pt denies smoking, drinking or consuming illicit substances. Pt specifically denies any fever, chills or HAs. There are no other complaints, changes, or physical findings at this time. PCP: Priyanka Maravilla MD 
 
No current facility-administered medications on file prior to encounter. Current Outpatient Medications on File Prior to Encounter Medication Sig Dispense Refill  losartan-hydroCHLOROthiazide (HYZAAR) 100-25 mg per tablet Take 1 Tab by mouth every morning. 30 Tab 1  
 atenolol (TENORMIN) 50 mg tablet Take 1 Tab by mouth daily. 30 Tab 1  
 ferrous sulfate (IRON) 325 mg (65 mg iron) tablet Take 1 Tab by mouth Daily (before breakfast).  30 Tab 2  
  ibuprofen (MOTRIN) 600 mg tablet Take 1 Tab by mouth every six (6) hours as needed for Pain. 30 Tab 1  
 amLODIPine (NORVASC) 5 mg tablet Take 1 Tab by mouth daily. (Patient taking differently: Take 5 mg by mouth every evening.) 30 Tab 0 Past History Past Medical History: 
Past Medical History:  
Diagnosis Date  Abnormal Papanicolaou smear of cervix  Anemia NEC   
 with pregnancy. Taking IRON  
 Chlamydia  Essential hypertension  Hypertension  Infertility   
 cerclage, prev sab  
 Ovarian cyst   
 
 
Past Surgical History: 
Past Surgical History:  
Procedure Laterality Date  HX CHOLECYSTECTOMY  HX DILATION AND CURETTAGE    
 HX GYN Cerclage  HX LAP CHOLECYSTECTOMY   Family History: 
Family History Problem Relation Age of Onset  Stroke Mother  Diabetes Father  Hypertension Father  Diabetes Sister Social History: 
Social History Tobacco Use  Smoking status: Former Smoker Last attempt to quit: 10/22/2011 Years since quittin.3  Smokeless tobacco: Never Used  Tobacco comment: one or two cigarettes a month Substance Use Topics  Alcohol use: Yes Alcohol/week: 1.8 oz Types: 3 Glasses of wine per week Comment: \"few times a week\"  Drug use: No  
 
 
Allergies: 
No Known Allergies Review of Systems Review of Systems Constitutional: Negative for chills and fever. Respiratory: Positive for cough. Cardiovascular: Positive for chest pain. Gastrointestinal: Negative for abdominal pain. Neurological: Positive for light-headedness. Negative for headaches. All other systems reviewed and are negative. Physical Exam  
Physical Exam  
Vital signs and nursing notes reviewed CONSTITUTIONAL: Alert, in mild distress; well-developed; well-nourished. HEAD:  Normocephalic, atraumatic EYES: PERRL; EOM's intact. ENTM: Nose: no rhinorrhea;  Throat: no erythema or exudate, mucous membranes moist 
Neck:  Supple. trachea is midline. RESP: Chest clear, equal breath sounds. - W/R/R, occasional cough, RR = 16, speaking in full sentences, reproducible CW tenderness at the sternum. CV: S1 and S2 WNL; No murmurs, gallops or rubs. 2+ radial and DP pulses bilaterally. GI: non-distended, normal bowel sounds, abdomen soft and non-tender. No masses or organomegaly. : No costo-vertebral angle tenderness. BACK:  Non-tender, normal appearance UPPER EXT:  Normal inspection. no joint or soft tissue swelling LOWER EXT: No edema, no calf tenderness. NEURO: Alert and oriented x3, 5/5 strength and light touch sensation intact in bilateral upper and lower extremities. SKIN: No rashes; Warm and dry PSYCH: Normal mood, normal affect Medical Decision Making I am the first provider for this patient. I reviewed the vital signs, available nursing notes, past medical history, past surgical history, family history and social history. Vital Signs-Reviewed the patient's vital signs. Patient Vitals for the past 12 hrs: 
 Temp Pulse Resp BP SpO2  
02/26/19 1303 98.1 °F (36.7 °C) 86 16 (!) 188/128 100 % Pulse Oximetry Analysis - 100% on RA Cardiac Monitor:  
Rate: 86 bpm 
Rhythm: Normal Sinus Rhythm Records Reviewed: Nursing Notes and Old Medical Records Provider Notes (Medical Decision Making): DDx: 36 yo female with acute CW strain secondary to 3 weeks of resolving cough related to likely influenza infection. Will provide supporting outpatient prescriptions for bronchitis, a prescription for vaginal yeast infection and OTC therapy for reducing CW inflammation. Today's evaluation not c/w cardiac ischemia, ACS, PE, pericarditis or other intrathoracic emergency. ED Course:  
Initial assessment performed.  The patients presenting problems have been discussed, and they are in agreement with the care plan formulated and outlined with them. I have encouraged them to ask questions as they arise throughout their visit. 1:48 PM 
On exam, pt's BP was 194/129. Critical Care Time:  
0 Disposition: 
DISCHARGE NOTE: 
1:59 PM 
The patient is ready for discharge. The patients signs, symptoms, diagnosis, and instructions for discharge have been discussed and the pt has conveyed their understanding. The patient is to follow up as recommended or return to the ER should their symptoms worsen. Plan has been discussed and patient has conveyed their agreement. PLAN: 
1. Discharge Discharge Medication List as of 2/26/2019  1:59 PM  
  
START taking these medications Details  
predniSONE (DELTASONE) 20 mg tablet Take 40 mg by mouth daily for 5 days. , Normal, Disp-10 Tab, R-0  
  
benzonatate (TESSALON PERLES) 100 mg capsule Take 2 Caps by mouth three (3) times daily as needed for Cough for up to 15 doses. , Print, Disp-15 Cap, R-0  
  
miconazole nitrate (MONISTAT 3) 200 mg- 2 % (9 gram) kit Insert 1 Each into vagina nightly for 3 days. , Normal, Disp-1 Each, R-0  
  
  
CONTINUE these medications which have NOT CHANGED Details  
losartan-hydroCHLOROthiazide (HYZAAR) 100-25 mg per tablet Take 1 Tab by mouth every morning., Normal, Disp-30 Tab, R-1  
  
atenolol (TENORMIN) 50 mg tablet Take 1 Tab by mouth daily. , Normal, Disp-30 Tab, R-1  
  
ferrous sulfate (IRON) 325 mg (65 mg iron) tablet Take 1 Tab by mouth Daily (before breakfast). , Print, Disp-30 Tab, R-2  
  
ibuprofen (MOTRIN) 600 mg tablet Take 1 Tab by mouth every six (6) hours as needed for Pain., Print, Disp-30 Tab, R-1  
  
amLODIPine (NORVASC) 5 mg tablet Take 1 Tab by mouth daily. , Print, Disp-30 Tab, R-0  
  
  
 
2. Follow-up Information Follow up With Specialties Details Why Contact Info [facility] EMERGENCY DEPT Emergency Medicine  If symptoms worsen including fever, uncontrolled pain or other new concerning symptoms. [address] [place] [address] [address] [facility] 
[phone] Please take ibuprofen 800 mg every 8 hours X 5 days to reduce chest wall pain. Please take with food to avoid ibuprofen irritating your stomach. Return to ED if worse Diagnosis Clinical Impression: 1. Acute bronchitis, unspecified organism 2. Chest wall pain 3. Vaginal yeast infection 4. Essential hypertension Attestations: This note is prepared by Delano Ruano, acting as Scribe for Mabel Bustillo MD. Mabel Bustillo MD: The scribe's documentation has been prepared under my direction and personally reviewed by me in its entirety. I confirm that the note above accurately reflects all work, treatment, procedures, and medical decision making performed by me.

## 2020-05-27 ENCOUNTER — VIRTUAL VISIT (OUTPATIENT)
Dept: INTERNAL MEDICINE CLINIC | Age: 35
End: 2020-05-27

## 2020-05-27 DIAGNOSIS — I10 ESSENTIAL HYPERTENSION, BENIGN: Primary | ICD-10-CM

## 2020-05-27 DIAGNOSIS — Z91.14 NONCOMPLIANCE WITH MEDICATION REGIMEN: ICD-10-CM

## 2020-05-27 NOTE — PROGRESS NOTES
Chief Complaint   Patient presents with    Hypertension     1. Have you been to the ER, urgent care clinic since your last visit? Hospitalized since your last visit? No    2. Have you seen or consulted any other health care providers outside of the [address] [place] [address] since your last visit? Include any pap smears or colon screening.  No

## 2020-05-27 NOTE — PROGRESS NOTES
Anibal Maguire is a 29 y.o. female who was seen by synchronous (real-time) audio-video technology on 5/27/2020. Consent: Anibal Magurie, who was seen by synchronous (real-time) audio-video technology, and/or her healthcare decision maker, is aware that this patient-initiated, Telehealth encounter on 5/27/2020 is a billable service, with coverage as determined by her insurance carrier. She is aware that she may receive a bill and has provided verbal consent to proceed: Yes. Assessment & Plan:   Diagnoses and all orders for this visit:    1. Essential hypertension, benign- untreated, uncontrolled per history. I recommended office visit for evaluation and management. Recommended lifestyle modification. I have discussed the diagnosis with the patient and the intended plan as seen in the  orders above. The patient understands and agees with the plan. All questions the patient posed were answered. Patient labs and/or xrays were reviewed  Past records were reviewed. OFFICE VISIT 1 WEEK      Subjective:   Anibal Maguire is a 29 y.o. female who was seen for Hypertension    New patient for hypertension evaluation. Hypertension onset was at age 16. Hypertension is uncomplicated. Current blood pressure range is possibly 140/. She denies chest pain legs swelling, palpitations, headache or dizziness. Patient is using her aunt's amlodipine intermittently. She has access to a blood pressure cuff but does not own one. She follows a low-sodium diet. Pt denies formal aerobic exercise but admits she is \"always moving. \"   Most recently her hypertension was managed by her OB/GYN. Pt was followed at the [place] [address] in the past.  She used labetalol in the past but that caused headache. She urinated a lot with losartan hydrochlorothiazide which was discontinued during pregnancy. She works as a  and has an 6year-old child.           Prior to Admission medications Medication Sig Start Date End Date Taking? Authorizing Provider   benzonatate (TESSALON PERLES) 100 mg capsule Take 2 Caps by mouth three (3) times daily as needed for Cough for up to 15 doses. 19  Julian Merritt MD   losartan-hydroCHLOROthiazide [facility]) 100-25 mg per tablet Take 1 Tab by mouth every morning. 10/22/18 5/27/20  Nereyda Alonzo MD   atenolol (TENORMIN) 50 mg tablet Take 1 Tab by mouth daily. 10/22/18 5/27/20  Nereyda Alonzo MD   ibuprofen (MOTRIN) 600 mg tablet Take 1 Tab by mouth every six (6) hours as needed for Pain. 18   Maged Webber CNM   ferrous sulfate (IRON) 325 mg (65 mg iron) tablet Take 1 Tab by mouth Daily (before breakfast). 18  Maged Webber CNM   amLODIPine (NORVASC) 5 mg tablet Take 1 Tab by mouth daily. Patient taking differently: Take 5 mg by mouth every evening. 18   Shamir Rivas MD     No Known Allergies    Patient Active Problem List   Diagnosis Code    Chronic hypertension complicating or reason for care during pregnancy O10.919    Cervical incompetence affecting management of pregnancy in second trimester, antepartum O34.32    Intrauterine fetal death in pregnancy O36. 4XX0    Incomplete  with delayed or excessive hemorrhage O03.1     Patient Active Problem List    Diagnosis Date Noted    Incomplete  with delayed or excessive hemorrhage 2018    Intrauterine fetal death in pregnancy 2017    Cervical incompetence affecting management of pregnancy in second trimester, antepartum 2016    Chronic hypertension complicating or reason for care during pregnancy 2016     Current Outpatient Medications   Medication Sig Dispense Refill    ibuprofen (MOTRIN) 600 mg tablet Take 1 Tab by mouth every six (6) hours as needed for Pain. 30 Tab 1    amLODIPine (NORVASC) 5 mg tablet Take 1 Tab by mouth daily.  (Patient taking differently: Take 5 mg by mouth every evening.) 30 Tab 0     No Known Allergies  Past Medical History:   Diagnosis Date    Abnormal Papanicolaou smear of cervix     Anemia NEC     with pregnancy. Taking IRON    Chlamydia     Essential hypertension     Hypertension     Infertility     cerclage, prev sab    Ovarian cyst      Past Surgical History:   Procedure Laterality Date    HX CHOLECYSTECTOMY      HX DILATION AND CURETTAGE      HX GYN      Cerclage    HX LAP CHOLECYSTECTOMY  2008     Family History   Problem Relation Age of Onset    Stroke Mother     Diabetes Father     Hypertension Father     Diabetes Sister      Social History     Tobacco Use    Smoking status: Former Smoker     Last attempt to quit: 10/22/2011     Years since quittin.6    Smokeless tobacco: Never Used    Tobacco comment: one or two cigarettes a month   Substance Use Topics    Alcohol use: Yes     Alcohol/week: 3.0 standard drinks     Types: 3 Glasses of wine per week     Comment: \"few times a week\"       Review of Systems   Cardiovascular: Negative for chest pain, palpitations, orthopnea, leg swelling and PND. Neurological: Negative for dizziness and headaches. Objective:   Vital Signs: (As obtained by patient/caregiver at home)  There were no vitals taken for this visit.      [INSTRUCTIONS:  \"[x]\" Indicates a positive item  \"[]\" Indicates a negative item  -- DELETE ALL ITEMS NOT EXAMINED]    Constitutional: [x] Appears well-developed and well-nourished [x] No apparent distress      [] Abnormal -     Mental status: [x] Alert and awake  [x] Oriented to person/place/time [x] Able to follow commands    [] Abnormal -     Eyes:   EOM    [x]  Normal    [] Abnormal -   Sclera  [x]  Normal    [] Abnormal -          Discharge [x]  None visible   [] Abnormal -     HENT: [x] Normocephalic, atraumatic  [] Abnormal -   [x] Mouth/Throat: Mucous membranes are moist    External Ears [x] Normal  [] Abnormal -    Neck: [x] No visualized mass [] Abnormal -     Pulmonary/Chest: [x] Respiratory effort normal   [x] No visualized signs of difficulty breathing or respiratory distress        [] Abnormal -      Musculoskeletal:   [x] Normal gait with no signs of ataxia         [x] Normal range of motion of neck        [] Abnormal -     Neurological:        [x] No Facial Asymmetry (Cranial nerve 7 motor function) (limited exam due to video visit)          [x] No gaze palsy        [] Abnormal -          Skin:        [x] No significant exanthematous lesions or discoloration noted on facial skin         [] Abnormal -            Psychiatric:       [x] Normal Affect [] Abnormal -        [x] No Hallucinations    Other pertinent observable physical exam findings:-        We discussed the expected course, resolution and complications of the diagnosis(es) in detail. Medication risks, benefits, costs, interactions, and alternatives were discussed as indicated. I advised her to contact the office if her condition worsens, changes or fails to improve as anticipated. She expressed understanding with the diagnosis(es) and plan. Swati Germain is a 29 y.o. female who was evaluated by a video visit encounter for concerns as above. Patient identification was verified prior to start of the visit. A caregiver was present when appropriate. Due to this being a TeleHealth encounter (During KDGXI-99 public health emergency), evaluation of the following organ systems was limited: Vitals/Constitutional/EENT/Resp/CV/GI//MS/Neuro/Skin/Heme-Lymph-Imm. Pursuant to the emergency declaration under the [facility], 1135 waiver authority and the [employer] and [employer] Act, this Virtual  Visit was conducted, with patient's (and/or legal guardian's) consent, to reduce the patient's risk of exposure to COVID-19 and provide necessary medical care.      Services were provided through a video synchronous discussion virtually to substitute for in-person clinic visit. Patient and provider were located at their individual homes.       Lowell Lares MD

## 2020-07-27 ENCOUNTER — HOSPITAL ENCOUNTER (EMERGENCY)
Age: 35
Discharge: HOME | End: 2020-07-27
Attending: EMERGENCY MEDICINE
Payer: MEDICAID

## 2020-07-27 VITALS
WEIGHT: 192.68 LBS | OXYGEN SATURATION: 100 % | DIASTOLIC BLOOD PRESSURE: 124 MMHG | RESPIRATION RATE: 16 BRPM | SYSTOLIC BLOOD PRESSURE: 191 MMHG | HEIGHT: 68 IN | TEMPERATURE: 98.4 F | HEART RATE: 77 BPM | BODY MASS INDEX: 29.2 KG/M2

## 2020-07-27 DIAGNOSIS — I10 ESSENTIAL HYPERTENSION: Primary | ICD-10-CM

## 2020-07-27 PROCEDURE — 99282 EMERGENCY DEPT VISIT SF MDM: CPT

## 2020-07-27 NOTE — ED NOTES
Patient discharged by Dr. Reginaldo Carmichael and this RN. Patient ambulated with steady gait in NAD on departure.

## 2020-07-27 NOTE — ED PROVIDER NOTES
EMERGENCY DEPARTMENT HISTORY AND PHYSICAL EXAM      Date: 7/27/2020  Patient Name: Leatha Walden    Please note that this dictation was completed with [employer], the computer voice recognition software. Quite often unanticipated grammatical, syntax, homophones, and other interpretive errors are inadvertently transcribed by the computer software. Please disregard these errors. Please excuse any errors that have escaped final proofreading. History of Presenting Illness     Chief Complaint   Patient presents with    Hypertension     sent over by gyn md.  he gave her a script for bp meds, but \"sent me over for something to bring it down. \"       History Provided By: Patient     HPI: Leatha Walden, 29 y.o. female, with a past medical history significant for hypertension not currently on any antihypertensives presenting the emergency department sent over by her OB/GYN. She has no complaints. Denies any chest pain, shortness of breath, headache, visual disturbances. Denies any cocaine use. Does report almost daily marijuana use. Denies chance of pregnancy. PCP: Amanda Richardson MD    No current facility-administered medications on file prior to encounter. Current Outpatient Medications on File Prior to Encounter   Medication Sig Dispense Refill    ibuprofen (MOTRIN) 600 mg tablet Take 1 Tab by mouth every six (6) hours as needed for Pain. 30 Tab 1    amLODIPine (NORVASC) 5 mg tablet Take 1 Tab by mouth daily. (Patient taking differently: Take 5 mg by mouth every evening.) 30 Tab 0       Past History     Past Medical History:  Past Medical History:   Diagnosis Date    Abnormal Papanicolaou smear of cervix     Anemia NEC     with pregnancy.  Taking IRON    Chlamydia     Essential hypertension     Hypertension     Infertility     cerclage, prev sab    Ovarian cyst        Past Surgical History:  Past Surgical History:   Procedure Laterality Date    HX CHOLECYSTECTOMY      HX DILATION AND CURETTAGE      HX GYN      Cerclage    HX LAP CHOLECYSTECTOMY  2008       Family History:  Family History   Problem Relation Age of Onset    Stroke Mother     Diabetes Father     Hypertension Father     Diabetes Sister        Social History:  Social History     Tobacco Use    Smoking status: Former Smoker     Last attempt to quit: 10/22/2011     Years since quittin.7    Smokeless tobacco: Never Used    Tobacco comment: one or two cigarettes a month   Substance Use Topics    Alcohol use: Yes     Alcohol/week: 3.0 standard drinks     Types: 3 Glasses of wine per week     Comment: \"few times a week\"    Drug use: No       Allergies:  No Known Allergies      Review of Systems   Review of Systems   Constitutional: Negative for chills and fever. HENT: Negative for congestion and sore throat. Eyes: Negative for visual disturbance. Respiratory: Negative for cough and shortness of breath. Cardiovascular: Negative for chest pain and leg swelling. Gastrointestinal: Negative for abdominal pain, blood in stool, diarrhea and nausea. Endocrine: Negative for polyuria. Genitourinary: Negative for dysuria, flank pain, vaginal bleeding and vaginal discharge. Musculoskeletal: Negative for myalgias. Skin: Negative for rash. Allergic/Immunologic: Negative for immunocompromised state. Neurological: Negative for weakness and headaches. Psychiatric/Behavioral: Negative for confusion. Physical Exam   Physical Exam  Vitals signs and nursing note reviewed. Constitutional:       Appearance: She is well-developed. HENT:      Head: Normocephalic and atraumatic. Eyes:      General:         Right eye: No discharge. Left eye: No discharge. Conjunctiva/sclera: Conjunctivae normal.      Pupils: Pupils are equal, round, and reactive to light. Neck:      Musculoskeletal: Normal range of motion and neck supple. Trachea: No tracheal deviation.    Cardiovascular:      Rate and Rhythm: Normal rate and regular rhythm. Heart sounds: Normal heart sounds. No murmur. Pulmonary:      Effort: Pulmonary effort is normal. No respiratory distress. Breath sounds: Normal breath sounds. No wheezing or rales. Abdominal:      General: Bowel sounds are normal.      Palpations: Abdomen is soft. Tenderness: There is no abdominal tenderness. There is no guarding or rebound. Musculoskeletal: Normal range of motion. General: No tenderness or deformity. Skin:     General: Skin is warm and dry. Findings: No erythema or rash. Neurological:      Mental Status: She is alert and oriented to person, place, and time. Psychiatric:         Behavior: Behavior normal.         Diagnostic Study Results     Labs -   No results found for this or any previous visit (from the past 12 hour(s)). Radiologic Studies -   No orders to display     CT Results  (Last 48 hours)    None        CXR Results  (Last 48 hours)    None            Medical Decision Making   I am the first provider for this patient. I reviewed the vital signs, available nursing notes, past medical history, past surgical history, family history and social history. Vital Signs-Reviewed the patient's vital signs. Patient Vitals for the past 12 hrs:   Temp Pulse Resp BP SpO2   07/27/20 1738 98.4 °F (36.9 °C) 77 16 (!) 191/124 100 %           Records Reviewed:   Nursing notes, Prior visits     Provider Notes (Medical Decision Making):   Patient here with asymptomatic hypertension. She is to follow-up with the PCP later this week. Her OB/GYN is has started her on some amlodipine, but she has not picked up the prescription yet. She states that since she is following up with her primary care provider she was requesting to hold on any labs at this time. I think that labs are likely of low value at this point as she does not require hospitalization. This is asymptomatic hypertension. She can follow-up with PCP.   She should start the amlodipine and take it as prescribed. ED Course:   Initial assessment performed. The patients presenting problems have been discussed, and they are in agreement with the care plan formulated and outlined with them. I have encouraged them to ask questions as they arise throughout their visit. Critical Care Time:   none    Disposition:  DISCHARGE NOTE  Patients results have been reviewed with them. Patient and/or family have verbally conveyed their understanding and agreement of the patient's signs, symptoms, diagnosis, treatment and prognosis and additionally agree to follow up as recommended or return to the Emergency Room should their condition change or have any new concerns prior to their follow-up appointment. Patient verbally agrees with the care-plan and verbally conveys that all of their questions have been answered. Discharge instructions have also been provided to the patient with some educational information regarding their diagnosis as well a list of reasons why they would want to return to the ER prior to their follow-up appointment should their condition change. PLAN:  1. Discharge Medication List as of 7/27/2020  6:47 PM        2. Follow-up Information     Follow up With Specialties Details Why Contact Info    Loreta Hull MD Family Medicine Schedule an appointment as soon as possible for a visit  [place] [address]  [facility] [phone]      [facility] EMERGENCY DEPT Emergency Medicine  If symptoms worsen [address]  [address] [facility]  [phone]          Return to ED if worse     Diagnosis     Clinical Impression:   1. Essential hypertension        Attestations:   This note was completed by Rukhsana Morales DO

## 2020-07-27 NOTE — DISCHARGE INSTRUCTIONS

## 2022-03-18 PROBLEM — O36.4XX0 INTRAUTERINE FETAL DEATH IN PREGNANCY: Status: ACTIVE | Noted: 2017-09-11

## 2022-03-19 PROBLEM — O03.1 INCOMPLETE ABORTION WITH DELAYED OR EXCESSIVE HEMORRHAGE: Status: ACTIVE | Noted: 2018-08-25

## 2023-05-11 RX ORDER — IBUPROFEN 600 MG/1
TABLET ORAL EVERY 6 HOURS PRN
COMMUNITY
Start: 2018-08-26

## 2023-05-11 RX ORDER — AMLODIPINE BESYLATE 5 MG/1
TABLET ORAL DAILY
COMMUNITY
Start: 2018-08-17

## 2023-10-03 NOTE — ED NOTES
Assumed care of patient from triage. Patient c/o high blood pressure, was sent from her OB office for medication. Patient was prescribed medication to take at home as well. Patient denies headache/neuro changes. Patient a/o x 4. DA AMBULATORY ENCOUNTER  FAMILY PRACTICE OFFICE VISIT      CHIEF COMPLAINT:    Chief Complaint   Patient presents with   • Follow-up       SUBJECTIVE:  Ara Holly is a 62 year old female who presents for follow up    Patient is currently taking 5 mg of amlodipine and 100 mg of losartan daily for elevated blood pressure. She has been taking her medications as directed.     Patient is currently taking 40 mg of atorvastatin daily for elevated cholesterol. She has not been following any particular diet.     Patient is currently taking Wellbutrin 150 mg twice daily for depression and anxiety. She feels that this is stable.     Patient is currently taking 3 mg of Trulicity weekly as well as 52 units of glargine daily and Humalog 15 units 3 times daily with metformin 1000 mg twice daily for her diabetes. Her sugars in the morning are running about 135. She does have a sweet tooth.     Patient is currently taking omeprazole 40 mg daily for acid reflux.She is having increased pressure in her gut after eating.      Patient is currently using albuterol as needed as well as Atrovent nasal spray, Singulair 10 mg daily and Symbicort 2 puffs twice daily for seasonal allergies and asthma/COPD.    Patient was seen by the dermatologist for her dry skin. She does not feel that any of the symptoms have helped. She is currently using aloe currently but feels this doesn't really work either.     She has been having a rash under her breasts. She has been using the cream and does not feel that this is really working.    She is due for a colonoscopy. She has been having some bloating or fullness in her gut.. She has tried gasex and other meds She is wondering if something more can be done.      Health Maintenance Due   Topic Date Due   • Diabetes Eye Exam  Never done   • Lung Cancer Screening  10/13/2021   • Breast Cancer Screening  10/13/2022   • Colorectal Cancer Screen-  02/11/2023       Patient is due for topics as listed  above but is not proceeding        REVIEW OF SYSTEMS:    Constitutional Problems: Denies fever, sweats or chills.   Eye Problems: Denies eye pain or trouble with vision.   ENT Problems: Denies sore throat, trouble swallowing, ear pain, trouble hearing, or congestion.   Cardiovascular Problems: Denies chest pain, chest pressure, dizziness, palpitations, swelling in the legs, or dyspnea on exertion.   Respiratory Problems: Denies shortness of breath or cough.   Breasts: Denies nipple drainage, lumps or bumps in breasts, or painful breasts  Gastrointestinal Problems: Denies abdominal pain, nausea, vomiting, diarrhea, constipation, blood in stool, black stool, or heartburn.   Genitourinary Problems: Denies burning with urination, blood in urine   Musculoskeletal Problems: Denies muscle or joint pain or swelling.  Integumentary Problems: See HPI  Neurological Problems: Denies headaches, numbness, tingling, dizziness, or focal weakness.  Psychiatric Problems: Denies depressed mood, anxiety, or insomnia.  Endocrine Problems: Denies increased thirst, urination, heat or cold intolerance.       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)   • Gastroesophageal reflux disease without esophagitis   • Hypertension associated with chronic kidney disease due to type 2 diabetes mellitus (CMD)   • Mixed hyperlipidemia   • Chronic constipation   • Type 2 diabetes mellitus with microalbuminuria, with long-term current use of insulin (CMD)   • Chronic pain of both lower extremities   • Chronic kidney disease   • Malignant neoplasm (CMD)   • Endometrial adenocarcinoma (CMD)   • ARSH on CPAP   • Moderate nonproliferative diabetic retinopathy without macular edema associated with type 2 diabetes mellitus (CMD)   • Proteinuria   • Need for vaccination   • Cigarette nicotine dependence without complication   • Class 3 severe obesity due to excess  [0] : 2) Feeling down, depressed, or hopeless: Not at all (0) calories with serious comorbidity and body mass index (BMI) of 40.0 to 44.9 in adult (CMD)   • Major depressive disorder, recurrent episode, moderate (CMD)   • Overweight       MEDICATIONS:  Current Outpatient Medications on File Prior to Visit   Medication Sig Dispense Refill   • Insulin Pen Needle (B-D U/F PEN NEEDLE 5/16\") 31G X 8 MM Misc Use as directed to inject 4 times daily 100 each 2   • albuterol 108 (90 Base) MCG/ACT inhaler Inhale 2 puffs into the lungs every 6 hours as needed for shortness of breath or wheezing. 8.5 g 2   • Symbicort 80-4.5 MCG/ACT inhaler Inhale 2 puffs into the lungs in the morning and 2 puffs before bedtime. 10.2 g 1   • ketoconazole (NIZORAL) 2 % cream APPLY AT BEDTIME TO RASH UNDER BREASTS AS NEEDED 60 g 3   • blood glucose test strip TEST BLOOD SUGAR TWICE DAILY 100 each 3   • amLODIPine (NORVASC) 5 MG tablet Take 1 tablet by mouth daily. 90 tablet 1   • atorvastatin (LIPITOR) 40 MG tablet Take 1 tablet by mouth nightly. 90 tablet 1   • losartan (COZAAR) 100 MG tablet Take 1 tablet by mouth daily. 90 tablet 1   • buPROPion (WELLBUTRIN SR) 150 MG 12 hr tablet Take 1 tablet by mouth 2 times daily. 180 tablet 1   • insulin lispro (HumaLOG KwikPen) 200 UNIT/ML pen-injector Inject 15 Units into the skin in the morning and 15 Units at noon and 15 Units in the evening. Inject before meals. 18 mL 3   • insulin glargine 100 UNIT/ML pen-injector Inject 52 Units into the skin daily. Prime 2 units before each dose. 45 mL 3   • metFORMIN (GLUCOPHAGE-XR) 500 MG 24 hr tablet Take 2 tablets by mouth 2 times daily. 360 tablet 1   • montelukast (SINGULAIR) 10 MG tablet Take 1 tablet by mouth daily. 90 tablet 1   • ipratropium (ATROVENT) 0.06 % nasal spray Spray 1 spray in each nostril 4 times daily as needed for Rhinitis. 15 mL 5   • [DISCONTINUED] semaglutide,0.25 or 0.5 mg/DOSE, (OZEMPIC) (1.34 mg/ml) 0.25 or 0.5 MG/DOSE injection Inject 0.25 mg into the skin every 7 days for 30 days, THEN 0.5 mg  [[id number]] : 0 every 7 days. 3 mL 0   • clotrimazole-betamethasone (LOTRISONE) 1-0.05 % cream      • [DISCONTINUED] buPROPion (ZYBAN) 150 MG 12 hr tablet Take 1 tablet by mouth 2 times daily. 180 tablet 0   • Blood Glucose Monitoring Suppl (ONETOUCH VERIO) w/Device Kit Use to test blood sugars twice daily. 1 kit 0   • Multiple Vitamin (MULTI-VITAMIN PO) Take 1 tablet by mouth daily.     • Ascorbic Acid (VITAMIN C) 500 MG tablet Take 500 mg by mouth 2 times daily.      • Omega-3 Fatty Acids (FISH OIL) 1200 MG capsule Take 1,200 mg by mouth 2 times daily.     • Magnesium 400 MG Cap Take 400 mg by mouth daily.      • Calcium Carbonate-Vitamin D (CALCIUM 600+D PO)      • Vitamin E 400 units Tab      • Cholecalciferol (VITAMIN D3) 2000 units capsule Take 2,000 Units by mouth daily.       No current facility-administered medications on file prior to visit.       PAST HISTORIES:  Allergies, Medications, Medical History, Surgical History, Social History and Family History were reviewed and updated.    OBJECTIVE:  PHYSICAL EXAM:    Vital Signs:    Visit Vitals  /72 (BP Location: RUE - Right upper extremity, Patient Position: Sitting, Cuff Size: Regular)   Pulse 84   Temp 98.5 °F (36.9 °C) (Oral)   Resp 18   Ht 5' 1\" (1.549 m)   Wt 104.5 kg (230 lb 6.1 oz)   LMP  (LMP Unknown)   SpO2 96%   BMI 43.53 kg/m²     General: Alert, cooperative, conversive in no acute distress  Head:  Normocephalic-atraumatic.   Eyes:  Normal conjunctivae and sclerae. PER, EOMI.  ENT:  Moist oral mucosa, no nasal drainage.  Cardiovascular:  Regular rate and rhythm without murmur, rubs or gallops.  Respiratory:  Normal respiratory effort.  Clear to auscultation.  No wheezes, rales or rhonchi.  Abdomen:  Bowel sounds normal. Soft. No tenderness. No masses. No pulsatile masses. No rebound or organomegaly.  Skin:  Warm and dry without rash, wounds, or ecchymoses in exposed areas.  Erythema is noted to the underside of her breasts along the chest wall. No bleeding  or weeping noted. Dry skin is noted to the legs bilaterally  Neurologic:  Oriented x3.  CN II-XII normal on gross inspection.  No focal deficits.  Psychiatric:  Cooperative.  Appropriate mood & affect.    LAB RESULTS:  All pertinent laboratory results were reviewed.    ASSESSMENT AND PLAN:  1. Mixed hyperlipidemia    2. Hypertension associated with chronic kidney disease due to type 2 diabetes mellitus (CMD)    3. Type 2 diabetes mellitus with diabetic cataract, with long-term current use of insulin (CMD)    4. Type 2 diabetes mellitus with microalbuminuria, with long-term current use of insulin (CMD)    5. Class 3 severe obesity due to excess calories with serious comorbidity and body mass index (BMI) of 40.0 to 44.9 in adult (CMD)    6. Moderate nonproliferative diabetic retinopathy of both eyes without macular edema associated with type 2 diabetes mellitus (CMD)    7. Gastroesophageal reflux disease without esophagitis    8. Chronic constipation    9. Stage 2 chronic kidney disease    10. Endometrial adenocarcinoma (CMD)    11. Major depressive disorder, recurrent episode, moderate (CMD)    12. Asthma with COPD    13. Chronic pain of both lower extremities    14. Need for vaccination    15. Health care maintenance      Hypertension  Well controlled at this time   Metabolic panel was ordered  Continue with 5 mg of amlodipine and 100 mg of losartan daily   Low salt diet is encouraged  Exercise is encouraged  Monitor BP at home  Follow up in 3 m    Hyperlipidemia  Lipid panel was ordered  Continue with 40 mg of atorvastatin daily   Diet and exercise are encouraged  Follow up in 3 months    Diabetes  A1c 5.8  Continue with Humalog decreased by 15 units taking this twice dialy  Continue with 3 mg of Trulicity weekly as well as 52 units of glargine daily, metformin 1000 mg twice daily  Reminded patient that they are due for eye exam  Diabetic diet was dicussed  Follow up in 3 months    Obesity  Diet and exercise were  discussed  Portion control is discussed      Review Flowsheet  More data exists       10/5/2023   PHQ 2/9 Score   Adult PHQ 2 Score 0   Adult PHQ 2 Interpretation No further screening needed   Little interest or pleasure in activity? Not at all   Feeling down, depressed or hopeless? Not at all       DEPRESSION ASSESSMENT/PLAN:  Start and/or continue medication.  See orders for details.    Continue with Wellbutrin 150 mg twice daily     Acid Reflux  Stable- increased bloating after eating.  Continue with omeprazole 40 mg daily- will trial twice daily to see if this helps with bloating   Referral to GI to consider upper GI and colonoscopy    Asthma/Allergies  Stable at this time  Continue with current medications    Rash  Try No crack for dry skin  Nystatin Powder for breast rash  Try to keep the area clean and dry    No problem-specific Assessment & Plan notes found for this encounter.    Orders Placed This Encounter   • Influenza Quadrivalent Split Pres Free 0.5 mL Pre-filled Vacc (FluLaval, Fluzone, Fluarix; ages 6+ months - [id number]   • Microalbumin Urine Random   • SERVICE TO GASTROENTEROLOGY   • POCT Glycohemoglobin Analyzer   • dulaglutide (TRULICITY) 3 MG/0.5ML pen-injector   • nystatin (MYCOSTATIN) 400273 UNIT/GM powder   • omeprazole (PriLOSEC) 40 MG capsule     Health Maintenance Due   Topic Date Due   • Diabetes Eye Exam  Never done   • Lung Cancer Screening  10/13/2021   • Breast Cancer Screening  10/13/2022   • Colorectal Cancer Screen-  02/11/2023       Follow up:   Return in about 3 months (around 1/5/2024) for medication check.    TIME:  35 minutes were spent for this encounter today, including pre-charting, note writing, face-to-face time, and/or care coordination.    The patient agreed to and expressed understanding of the assessment and plan and will call or message me with any questions or concerns.  Patient will be notified of results when they become available.    Sushma Newby,  WILMER  10/5/2023  7:11 PM   This patient was seen under the supervision of Mya Chavez MD

## 2024-11-14 NOTE — PERIOP NOTES
Handoff Report from Operating Room to PACU    Report received from [facility]  and Kermit Obrien CRNA  regarding [place]. Surgeon(s):  Teto Morgan MD  And Procedure(s) (LRB):  DILATATION AND CURETTAGE WITH SUCTION (N/A)  confirmed   with dressings discussed. Anesthesia type, drugs, patient history, complications, estimated blood loss, vital signs, intake and output, and last pain medication were reviewed. Refilled medication. Advised to take medication daily, advised to take BP 2-3 times weekly and log. Bring in log at next visit, if BP is over 150/90, RTC.

## 2025-03-06 NOTE — ED PROVIDER NOTES
51 year old pt getting admitted with suspected TIA/CVA  Pt started having  left arm/leg weakness and tingling and speech difficulties yesterday evening   Also he noticed he has some difficulties texting on smart p[gerard  Symptoms persisted today, came to ER and got admitted      EMERGENCY DEPARTMENT HISTORY AND PHYSICAL EXAM      Date: 6/27/2018  Patient Name: Ck Monroe    History of Presenting Illness     Chief Complaint   Patient presents with    Sore Throat     Ambulatory into the ED with c/o sore throat, painful swallowing and subjective fever x 6/24. Started taking an old prescription for Amoxicillin, yesterday. History Provided By: Patient    HPI: Ck Monroe, 28 y.o. female presents to the Emergency Dept with c/o acute onset sore throat and fever on 6/24. Pt states \"I am pretty sure I have strep\". Pt with + exposure to someone with similar sx. Tried starting an old Amoxicillin prescription yesterday. Pt states pain is worse when eating/swallowing. No cough or shortness of breath. Mild sinus congestion. Pt denied N/V/D, rash or lesion. States she was diagnosed with HTN during pregnancy and treated at that time but has not been on hypertension medication since delivering her child and does not currently have a PCP. Chief Complaint: sore throat, fever  Duration: 3 Days  Timing:  Acute  Location: throat  Quality: burning  Severity: 7 out of 10  Modifying Factors: symptoms intensify with attempts to eat/swallows  Associated Symptoms: denies any other associated signs or symptoms        There are no other complaints, changes, or physical findings at this time. PCP: PROVIDER UNKNOWN    Current Outpatient Prescriptions   Medication Sig Dispense Refill    amoxicillin (AMOXIL) 875 mg tablet Take 1 Tab by mouth two (2) times a day for 7 days. 14 Tab 0    ibuprofen (MOTRIN) 600 mg tablet Take 1 Tab by mouth every six (6) hours as needed for Pain for up to 20 doses. 20 Tab 0    hydroCHLOROthiazide (HYDRODIURIL) 25 mg tablet Take 1 Tab by mouth daily for 14 days. 14 Tab 0    amLODIPine (NORVASC) 10 mg tablet Take  by mouth daily.  labetalol (NORMODYNE) 200 mg tablet Take 200 mg by mouth two (2) times a day.  Indications: hypertension         Past History     Past Medical History:  Past Medical History:   Diagnosis Date    Abnormal Papanicolaou smear of cervix     Anemia NEC     with pregnancy. Taking IRON    Chlamydia     Essential hypertension     Hypertension     Infertility     cerclage, prev sab    Ovarian cyst        Past Surgical History:  Past Surgical History:   Procedure Laterality Date    HX GYN      Circlage    HX LAP CHOLECYSTECTOMY  2008       Family History:  Family History   Problem Relation Age of Onset    Stroke Mother     Diabetes Father     Hypertension Father     Diabetes Sister        Social History:  Social History   Substance Use Topics    Smoking status: Former Smoker    Smokeless tobacco: Never Used      Comment: one or two cigarettes a month    Alcohol use Yes      Comment: occasional       Allergies:  No Known Allergies      Review of Systems   Review of Systems   Constitutional: Positive for appetite change and fever. Negative for chills. HENT: Positive for sore throat. Negative for congestion, drooling, rhinorrhea and trouble swallowing. Eyes: Negative for redness and itching. Respiratory: Negative for cough and shortness of breath. Cardiovascular: Negative for chest pain and palpitations. Gastrointestinal: Negative for abdominal pain, diarrhea, nausea and vomiting. Endocrine: Negative for polydipsia, polyphagia and polyuria. Genitourinary: Negative for dysuria and hematuria. Musculoskeletal: Negative for myalgias, neck pain and neck stiffness. Skin: Negative for rash and wound. Allergic/Immunologic: Negative for food allergies and immunocompromised state. Neurological: Negative for dizziness, weakness and headaches. Psychiatric/Behavioral: Negative for agitation and confusion. The patient is not nervous/anxious. Physical Exam   Physical Exam   Constitutional: She is oriented to person, place, and time. She appears well-developed and well-nourished. No distress.    HENT:   Head: Normocephalic and atraumatic. Nose: Nose normal.   Mouth/Throat: Oropharyngeal exudate present. Posterior oropharynx erythematous with +exudate R > L, no stridor, no trismus, no drooling, mild increase effusion to bilat TMs but no erythema, good light reflex noted   Eyes: Conjunctivae and EOM are normal. Pupils are equal, round, and reactive to light. Right eye exhibits no discharge. Left eye exhibits no discharge. No scleral icterus. Neck: Normal range of motion. Neck supple. No JVD present. No tracheal deviation present. No thyromegaly present. +anterior cervical LAD noted   Cardiovascular: Normal rate, regular rhythm, normal heart sounds and intact distal pulses. Pulmonary/Chest: Effort normal and breath sounds normal. No respiratory distress. She has no wheezes. Abdominal: Soft. There is no tenderness. Musculoskeletal: Normal range of motion. She exhibits no edema or tenderness. Lymphadenopathy:     She has cervical adenopathy. Neurological: She is alert and oriented to person, place, and time. She exhibits normal muscle tone. Coordination normal.   Skin: Skin is warm and dry. No rash noted. She is not diaphoretic. No erythema. No pallor. Psychiatric: She has a normal mood and affect. Her behavior is normal.   Nursing note and vitals reviewed. Diagnostic Study Results     Labs -   No results found for this or any previous visit (from the past 12 hour(s)). Radiologic Studies -   No orders to display       Medical Decision Making   I am the first provider for this patient. I reviewed the vital signs, available nursing notes, past medical history, past surgical history, family history and social history. Vital Signs-Reviewed the patient's vital signs.   Patient Vitals for the past 12 hrs:   Temp Pulse Resp BP SpO2   06/27/18 1053 - - - (!) 157/103 -   06/27/18 1024 98.7 °F (37.1 °C) 74 12 (!) 114/102 100 %           Records Reviewed: Nursing Notes and Old Medical Records    Provider Notes (Medical Decision Making):   URI, strep    ED Course:   Initial assessment performed. The patients presenting problems have been discussed, and they are in agreement with the care plan formulated and outlined with them. I have encouraged them to ask questions as they arise throughout their visit. DISCHARGE NOTE:  11:05 AM  The care plan has been outline with the patient and/or family, who verbally conveyed understanding and agreement. Available results have been reviewed. Patient and/or family understand the follow up plan as outlined and discharge instructions. Should their condition deterioration at any time after discharge the patient agrees to return, follow up sooner than outlined or seek medical assistance at the closest Emergency Room as soon as possible. Questions have been answered. Discharge instructions and educational information regarding the patient's diagnosis as well a list of reasons why the patient would want to seek immediate medical attention, should their condition change, were reviewed directly with the patient/family     PLAN:  1. Current Discharge Medication List      START taking these medications    Details   amoxicillin (AMOXIL) 875 mg tablet Take 1 Tab by mouth two (2) times a day for 7 days. Qty: 14 Tab, Refills: 0      ibuprofen (MOTRIN) 600 mg tablet Take 1 Tab by mouth every six (6) hours as needed for Pain for up to 20 doses. Qty: 20 Tab, Refills: 0      hydroCHLOROthiazide (HYDRODIURIL) 25 mg tablet Take 1 Tab by mouth daily for 14 days. Qty: 14 Tab, Refills: 0           2. Follow-up Information     Follow up With Details Comments [address]  [phone]      [facility] EMERGENCY DEPT  If symptoms worsen [address]   [address] [address] [phone]        Return to ED if worse     Diagnosis     Clinical Impression:   1.  Acute pharyngitis, unspecified etiology    2.  Elevated blood pressure reading

## (undated) DEVICE — SOLUTION SCRB 4OZ 10% PVP I POVIDONE IOD TOP PAINT EXIDINE

## (undated) DEVICE — CURETTE UTER VAC CRV RIG 7MM -- GYRUS

## (undated) DEVICE — STERILE POLYISOPRENE POWDER-FREE SURGICAL GLOVES: Brand: PROTEXIS

## (undated) DEVICE — HANDLE LT SNAP ON ULT DURABLE LENS FOR TRUMPF ALC DISPOSABLE

## (undated) DEVICE — HANDLE SUCT TBNG L6FR DIA3/8IN SWVL W/ M ADPT FOR BERK PMP

## (undated) DEVICE — Device

## (undated) DEVICE — DEVON™ KNEE AND BODY STRAP 60" X 3" (1.5 M X 7.6 CM): Brand: DEVON

## (undated) DEVICE — INFECTION CONTROL KIT SYS

## (undated) DEVICE — TOWEL SURG W17XL27IN STD BLU COT NONFENESTRATED PREWASHED

## (undated) DEVICE — COLLECTION KT SUC TISS BERK -- GYRUS

## (undated) DEVICE — PAD SANIT NPKN 4IN GRD

## (undated) DEVICE — CATH URETH INTMIT ROB 14FR FUN -- USE ITEM 179521

## (undated) DEVICE — GOWN,SIRUS,FABRNF,XL,20/CS: Brand: MEDLINE

## (undated) DEVICE — PERI/GYN PACK: Brand: CONVERTORS